# Patient Record
Sex: MALE | Race: WHITE | NOT HISPANIC OR LATINO | Employment: FULL TIME | URBAN - METROPOLITAN AREA
[De-identification: names, ages, dates, MRNs, and addresses within clinical notes are randomized per-mention and may not be internally consistent; named-entity substitution may affect disease eponyms.]

---

## 2017-07-31 ENCOUNTER — ALLSCRIPTS OFFICE VISIT (OUTPATIENT)
Dept: OTHER | Facility: OTHER | Age: 37
End: 2017-07-31

## 2017-12-11 ENCOUNTER — ALLSCRIPTS OFFICE VISIT (OUTPATIENT)
Dept: OTHER | Facility: OTHER | Age: 37
End: 2017-12-11

## 2017-12-12 NOTE — PROGRESS NOTES
Assessment    1  Acute maxillary sinusitis, recurrence not specified (461 0) (J01 00)   2  Never a smoker    Plan  Acute maxillary sinusitis, recurrence not specified    · Amoxicillin 875 MG Oral Tablet; 1 two times a day   · Promethazine-Codeine 6 25-10 MG/5ML Oral Syrup; TAKE 5 ML EVERY 8 HOURSAS NEEDED for cough   · Drink plenty of fluids ; Status:Complete;   Done: [id number]    Discussion/Summary  Possible side effects of new medications were reviewed with the patient/guardian today  The treatment plan was reviewed with the patient/guardian  The patient/guardian understands and agrees with the treatment plan      Chief Complaint  Sinus and chest congestion      History of Present Illness  HPI: he has had 4 days of sinus congestion and pressure  he has a post nasal drip and ear congestion  Review of Systems   Constitutional: no fever  ENT: sore throat  Respiratory: cough  Active Problems  1  Allergic rhinitis (477 9) (J30 9)   2  BMI 24 0-24 9, adult (V85 1) (Z68 24)   3  Murmur (785 2) (R01 1)   4  Other hyperlipidemia (272 4) (E78 4)    Past Medical History  1  History of Abdominal pain, RLQ (right lower quadrant) (789 03) (R10 31)   2  History of Abdominal pain, RUQ (789 01) (R10 11)   3  Acute bronchitis (466 0) (J20 9)   4  History of Acute bronchitis, unspecified organism (466 0) (J20 9)   5  History of Acute upper respiratory infection (465 9) (J06 9)   6  History of Bicipital tendinitis of right shoulder (726 12) (M75 21)   7  History of BMI 26 0-26 9,adult (V85 22) (Z68 26)   8  History of Cough (786 2) (R05)   9  History of Epicondylitis (726 32)   10  History of Exposure to influenza (V01 79) (Z20 828)   11  History of acute sinusitis (V12 69) (Z87 09)   12  History of allergic rhinitis (V12 69) (Z87 09)   13  History of chronic sinusitis (V12 69) (Z87 09)   14  History of esophageal reflux (V12 79) (Z87 19)   15  History of gastroesophageal reflux (GERD) (V12 79) (Z87 19)   16   History of influenza (V12 09) (Z87 09)   17  History of nocturia (V15 89) (Z87 898)   18  History of tinea corporis (V12 09) (Z86 19)   19  History of tinea corporis (V12 09) (Z86 19)   20  History of Lateral epicondylitis, unspecified laterality (726 32) (M77 10)   21  History of Murmur (785 2) (R01 1)   22  History of Pain in joint of right shoulder (719 41) (M25 511)   23  History of Right shoulder pain (719 41) (M25 511)   24  History of Right shoulder pain (719 41) (M25 511)   25  History of Stiffness of shoulder joint, unspecified laterality (719 51) (M25 619)   26  History of Stiffness of shoulder joint, unspecified laterality (719 51) (M25 619)   27  History of Tinea versicolor (111 0) (B36 0)   28  History of URTI (acute upper respiratory infection) (465 9) (J06 9)    Family History  Mother    1  Family history of hyperlipidemia (V18 19) (Z83 49)  Father    2  Family history of malignant neoplasm of prostate (V16 42) (Z80 45)    Social History   · Denied: History of Alcohol Use (History)   · Never a smoker   · History of Never A Smoker  The social history was reviewed and updated today  Surgical History  1  History of Appendectomy   2  History of Open Treatment Of Fracture Of Distal Radius    Current Meds   1  No Reported Medications Recorded    Allergies  1  No Known Drug Allergies    Vitals   Recorded: 05Ymu1002 09:03AM   Temperature 98 F   Heart Rate 72   Respiration 18   Systolic 492   Diastolic 62   Height 5 ft 10 in   Weight 182 lb    BMI Calculated 26 11   BSA Calculated 2 01       Physical Exam   Constitutional  General appearance: No acute distress, well appearing and well nourished  Head and Face  Palpation of the face and sinuses: Abnormal   Examination of the Sinuses: right maxillary tenderness-- and-- left maxillary tenderness  Ears, Nose, Mouth, and Throat  External inspection of ears and nose: Normal    Otoscopic examination: Tympanic membranes translucent with normal light reflex   Canals patent without erythema  Oropharynx: Normal with no erythema, edema, exudate or lesions  Pulmonary  Auscultation of lungs: Clear to auscultation  Cardiovascular  Auscultation of heart: Normal rate and rhythm, normal S1 and S2, no murmurs         Signatures   Electronically signed by : Trinidad Lares DO; Dec 11 2017  9:25AM EST                       (Author)

## 2018-01-14 VITALS
SYSTOLIC BLOOD PRESSURE: 118 MMHG | BODY MASS INDEX: 24.62 KG/M2 | RESPIRATION RATE: 16 BRPM | TEMPERATURE: 96.3 F | WEIGHT: 172 LBS | HEART RATE: 52 BPM | HEIGHT: 70 IN | DIASTOLIC BLOOD PRESSURE: 72 MMHG

## 2018-01-23 VITALS
BODY MASS INDEX: 26.05 KG/M2 | HEIGHT: 70 IN | DIASTOLIC BLOOD PRESSURE: 62 MMHG | RESPIRATION RATE: 18 BRPM | HEART RATE: 72 BPM | SYSTOLIC BLOOD PRESSURE: 112 MMHG | WEIGHT: 182 LBS | TEMPERATURE: 98 F

## 2021-07-22 ENCOUNTER — OFFICE VISIT (OUTPATIENT)
Dept: FAMILY MEDICINE CLINIC | Facility: CLINIC | Age: 41
End: 2021-07-22
Payer: COMMERCIAL

## 2021-07-22 VITALS
WEIGHT: 182 LBS | OXYGEN SATURATION: 98 % | RESPIRATION RATE: 16 BRPM | DIASTOLIC BLOOD PRESSURE: 70 MMHG | HEIGHT: 70 IN | BODY MASS INDEX: 26.05 KG/M2 | SYSTOLIC BLOOD PRESSURE: 120 MMHG | HEART RATE: 72 BPM

## 2021-07-22 DIAGNOSIS — Z11.4 SCREENING FOR HIV (HUMAN IMMUNODEFICIENCY VIRUS): ICD-10-CM

## 2021-07-22 DIAGNOSIS — Z13.6 SCREENING FOR CARDIOVASCULAR CONDITION: ICD-10-CM

## 2021-07-22 DIAGNOSIS — Z11.59 NEED FOR HEPATITIS C SCREENING TEST: ICD-10-CM

## 2021-07-22 DIAGNOSIS — B36.0 TINEA VERSICOLOR: Primary | ICD-10-CM

## 2021-07-22 PROCEDURE — 1036F TOBACCO NON-USER: CPT | Performed by: FAMILY MEDICINE

## 2021-07-22 PROCEDURE — 3725F SCREEN DEPRESSION PERFORMED: CPT | Performed by: FAMILY MEDICINE

## 2021-07-22 PROCEDURE — 3008F BODY MASS INDEX DOCD: CPT | Performed by: FAMILY MEDICINE

## 2021-07-22 PROCEDURE — 99203 OFFICE O/P NEW LOW 30 MIN: CPT | Performed by: FAMILY MEDICINE

## 2021-07-22 RX ORDER — KETOCONAZOLE 200 MG/1
200 TABLET ORAL DAILY
Qty: 7 TABLET | Refills: 0 | Status: SHIPPED | OUTPATIENT
Start: 2021-07-22 | End: 2021-07-29

## 2021-07-22 NOTE — ASSESSMENT & PLAN NOTE
He has discolored spots all over his torso extremities,   advised to see the dermatologist as he gets extensive rash every year   and he says he cannot use the cream and shampoo has his heart to apply and in the past he has been given oral medication which helps   prescription for ketoconazole is given and advised to have labs and come back for follow-up in a week and he needs to see a dermatologist too

## 2021-07-22 NOTE — PROGRESS NOTES
Assessment/Plan:    Problem List Items Addressed This Visit        Musculoskeletal and Integument    Tinea versicolor - Primary      He has discolored spots all over his torso extremities,   advised to see the dermatologist as he gets extensive rash every year   and he says he cannot use the cream and shampoo has his heart to apply and in the past he has been given oral medication which helps   prescription for ketoconazole is given and advised to have labs and come back for follow-up in a week and he needs to see a dermatologist too         Relevant Medications    ketoconazole (NIZORAL) 200 mg tablet    Other Relevant Orders    Ambulatory referral to Dermatology    CBC and differential       Other    Screening for cardiovascular condition    Relevant Orders    CBC and differential    Comprehensive metabolic panel    Lipid panel    TSH, 3rd generation    Need for hepatitis C screening test    Relevant Orders    Hepatitis C antibody    BMI 26 0-26 9,adult      labs and f/u 1 wk     Chief Complaint   Patient presents with    Establish Care   BMI Counseling: Body mass index is 26 11 kg/m²  The BMI is above normal  Nutrition recommendations include decreasing portion sizes, encouraging healthy choices of fruits and vegetables, moderation in carbohydrate intake and reducing intake of cholesterol  Exercise recommendations include moderate physical activity 150 minutes/week and exercising 3-5 times per week  Subjective:   Patient ID: Clemencia Holcomb is a 41 y o  male  He is a new patient, he says he has been getting a skin rash which has been diagnosed tenia last few years, it happens every year during summer ,, he says he used to be in [place] and he do lot of water sports,  He is very active and does regular exercise no smoking alcohol    He says that there is no itchy miss but he gets discolored spots on his back abdomen arms and legs      Review of Systems   Constitutional: Negative for activity change, appetite change, chills, fatigue, fever and unexpected weight change  HENT: Negative for congestion, ear discharge, ear pain, nosebleeds, postnasal drip, rhinorrhea, sinus pressure, sneezing, sore throat, trouble swallowing and voice change  Eyes: Negative for photophobia, pain, discharge, redness and itching  Respiratory: Negative for cough, chest tightness, shortness of breath and wheezing  Cardiovascular: Negative for chest pain, palpitations and leg swelling  Gastrointestinal: Negative for abdominal pain, constipation, diarrhea, nausea and vomiting  Endocrine: Negative for polyuria  Genitourinary: Negative for dysuria, frequency and urgency  Musculoskeletal: Negative for arthralgias, back pain, myalgias and neck pain  Skin: Negative for color change, pallor and rash  Allergic/Immunologic: Negative for environmental allergies and food allergies  Neurological: Negative for dizziness, weakness, light-headedness and headaches  Hematological: Negative for adenopathy  Does not bruise/bleed easily  Psychiatric/Behavioral: Negative for behavioral problems  The patient is not nervous/anxious  Objective:  Physical Exam  Vitals and nursing note reviewed  Constitutional:       Appearance: He is well-developed  HENT:      Head: Normocephalic and atraumatic  Eyes:      General: No scleral icterus  Right eye: No discharge  Left eye: No discharge  Conjunctiva/sclera: Conjunctivae normal       Pupils: Pupils are equal, round, and reactive to light  Neck:      Thyroid: No thyromegaly  Trachea: No tracheal deviation  Cardiovascular:      Rate and Rhythm: Normal rate and regular rhythm  Heart sounds: Normal heart sounds  No murmur heard  Pulmonary:      Effort: Pulmonary effort is normal  No respiratory distress  Breath sounds: Normal breath sounds  No wheezing or rales  Abdominal:      General: Bowel sounds are normal  There is no distension  Palpations: Abdomen is soft  There is no mass  Tenderness: There is no abdominal tenderness  There is no rebound  Musculoskeletal:         General: Normal range of motion  Cervical back: Normal range of motion and neck supple  Right lower leg: No edema  Left lower leg: No edema  Lymphadenopathy:      Cervical: No cervical adenopathy  Skin:     General: Skin is warm  Coloration: Skin is not pale  Findings: No erythema or rash  Neurological:      Mental Status: He is alert and oriented to person, place, and time  Cranial Nerves: No cranial nerve deficit  Deep Tendon Reflexes: Reflexes are normal and symmetric  Psychiatric:         Behavior: Behavior normal          Thought Content:  Thought content normal          Judgment: Judgment normal             Past Surgical History:   Procedure Laterality Date    APPENDECTOMY  02/26/2014    emergency surgery    FRACTURE SURGERY Left 07/20/2005    ORIF of open Monteggia fx dislocation left elbow    OTHER SURGICAL HISTORY      removal of hardware left elbow    SHOULDER ARTHROSCOPY Right 9/15/2016    Procedure: DIAGNOSTIC ARTHROSCOPY;  Surgeon: Debi Brantley MD;  Location: [facility];  Service:    [facility] TOOTH EXTRACTION      x 4       Family History   Problem Relation Age of Onset    No Known Problems Mother     Hypertension Father          Current Outpatient Medications:     ketoconazole (NIZORAL) 200 mg tablet, Take 1 tablet (200 mg total) by mouth daily for 7 days, Disp: 7 tablet, Rfl: 0    No Known Allergies    Vitals:    07/22/21 1425   BP: 120/70   Pulse: 72   Resp: 16   SpO2: 98%   Weight: 82 6 kg (182 lb)   Height: 5' 10" (1 778 m)

## 2021-08-10 DIAGNOSIS — B34.9 VIRAL INFECTION, UNSPECIFIED: Primary | ICD-10-CM

## 2021-08-10 PROCEDURE — U0003 INFECTIOUS AGENT DETECTION BY NUCLEIC ACID (DNA OR RNA); SEVERE ACUTE RESPIRATORY SYNDROME CORONAVIRUS 2 (SARS-COV-2) (CORONAVIRUS DISEASE [COVID-19]), AMPLIFIED PROBE TECHNIQUE, MAKING USE OF HIGH THROUGHPUT TECHNOLOGIES AS DESCRIBED BY CMS-2020-01-R: HCPCS | Performed by: FAMILY MEDICINE

## 2021-08-10 PROCEDURE — U0005 INFEC AGEN DETEC AMPLI PROBE: HCPCS | Performed by: FAMILY MEDICINE

## 2021-08-12 ENCOUNTER — TELEPHONE (OUTPATIENT)
Dept: FAMILY MEDICINE CLINIC | Facility: CLINIC | Age: 41
End: 2021-08-12

## 2021-08-12 ENCOUNTER — TELEMEDICINE (OUTPATIENT)
Dept: FAMILY MEDICINE CLINIC | Facility: CLINIC | Age: 41
End: 2021-08-12
Payer: COMMERCIAL

## 2021-08-12 VITALS — HEIGHT: 70 IN | WEIGHT: 182 LBS | BODY MASS INDEX: 26.05 KG/M2 | TEMPERATURE: 98.4 F

## 2021-08-12 DIAGNOSIS — U07.1 COVID-19 VIRUS DETECTED: Primary | ICD-10-CM

## 2021-08-12 PROBLEM — Z20.822 EXPOSURE TO COVID-19 VIRUS: Status: ACTIVE | Noted: 2021-08-12

## 2021-08-12 PROCEDURE — 3725F SCREEN DEPRESSION PERFORMED: CPT | Performed by: FAMILY MEDICINE

## 2021-08-12 PROCEDURE — 99213 OFFICE O/P EST LOW 20 MIN: CPT | Performed by: FAMILY MEDICINE

## 2021-08-12 NOTE — PROGRESS NOTES
COVID-19 Outpatient Progress Note    Assessment/Plan:    Problem List Items Addressed This Visit     None      Visit Diagnoses     COVID-19 virus detected    -  Primary         Disposition:     [facility] ANTIBODY INFUSION  I have spent 10 minutes directly with the patient  Greater than 50% of this time was spent in counseling/coordination of care regarding: diagnostic results, prognosis, risks and benefits of treatment options, instructions for management, patient and family education, importance of treatment compliance, risk factor reductions and impressions  Verification of patient location:    Patient is located in the following [facility] in which I hold an active license [place]    Encounter provider Delphine Mcdonnell MD    Provider located at [address] [place] [id number]    Recent Visits  No visits were found meeting these conditions  Showing recent visits within past 7 days and meeting all other requirements  Today's Visits  Date Type Provider Dept   08/12/21 Telemedicine Delphine Mcdonnell MD [facility]   Showing today's visits and meeting all other requirements  Future Appointments  No visits were found meeting these conditions  Showing future appointments within next 150 days and meeting all other requirements     This virtual check-in was done via [employer] and patient was informed that this is a secure, HIPAA-compliant platform  He agrees to proceed  Patient agrees to participate in a virtual check in via telephone or video visit instead of presenting to the office to address urgent/immediate medical needs  Patient is aware this is a billable service  After connecting through [facility], the patient was identified by name and date of birth  Rachaelsammy Wills was informed that this was a telemedicine visit and that the exam was being conducted confidentially over secure lines  My office door was closed  No one else was in the room   Shi Lynch Meena acknowledged consent and understanding of privacy and security of the telemedicine visit  I informed the patient that I have reviewed his record in Epic and presented the opportunity for him to ask any questions regarding the visit today  The patient agreed to participate  Subjective:   Jessa Quinteros is a 41 y o  male who has been screened for COVID-19  Symptom change since last report: improving  Patient's symptoms include fatigue and sore throat  Patient denies fever, chills, malaise, congestion, anosmia, loss of taste, cough, shortness of breath, chest tightness, abdominal pain, nausea, vomiting, diarrhea, myalgias and headaches  Alison Napoles has been staying home and has isolated themselves in his home  He is taking care to not share personal items and is cleaning all surfaces that are touched often, like counters, tabletops, and doorknobs using household cleaning sprays or wipes  He is wearing a mask when he leaves his room  Date of positive COVID-19 PCR: 8/10/2021    Lives alone  Lab Results   Component Value Date    SARSCOV2 Positive (A) 08/10/2021     History reviewed  No pertinent past medical history  Past Surgical History:   Procedure Laterality Date    APPENDECTOMY  02/26/2014    emergency surgery    FRACTURE SURGERY Left 07/20/2005    ORIF of open Monteggia fx dislocation left elbow    OTHER SURGICAL HISTORY      removal of hardware left elbow    SHOULDER ARTHROSCOPY Right 9/15/2016    Procedure: DIAGNOSTIC ARTHROSCOPY;  Surgeon: Alex Hobbs MD;  Location: [address] [place] [address];  Service:    [facility] TOOTH EXTRACTION      x 4     No current outpatient medications on file  No current facility-administered medications for this visit  No Known Allergies    Review of Systems   Constitutional: Positive for fatigue  Negative for chills and fever  HENT: Positive for sore throat  Negative for congestion  Respiratory: Negative    Negative for cough, chest tightness and shortness of breath  Cardiovascular: Negative  Gastrointestinal: Negative  Negative for abdominal pain, diarrhea, nausea and vomiting  Musculoskeletal: Negative  Negative for myalgias  Neurological: Negative  Negative for headaches  Psychiatric/Behavioral: Negative  Objective:    Vitals:    08/12/21 0951   Temp: 98 4 °F (36 9 °C)   Weight: 82 6 kg (182 lb)   Height: 5' 10" (1 778 m)       Physical Exam  Constitutional:       General: He is not in acute distress  Appearance: He is well-developed  Pulmonary:      Effort: Pulmonary effort is normal    Neurological:      Mental Status: He is alert and oriented to person, place, and time  Psychiatric:         Behavior: Behavior normal          Thought Content: Thought content normal          Judgment: Judgment normal          VIRTUAL VISIT DISCLAIMER    Albert Hernandez verbally agrees to participate in [employer]  Pt is aware that [employer] could be limited without vital signs or the ability to perform a full hands-on physical Dejuan Girt understands he or the provider may request at any time to terminate the video visit and request the patient to seek care or treatment in person

## 2021-08-18 ENCOUNTER — TELEMEDICINE (OUTPATIENT)
Dept: FAMILY MEDICINE CLINIC | Facility: CLINIC | Age: 41
End: 2021-08-18
Payer: COMMERCIAL

## 2021-08-18 DIAGNOSIS — U07.1 COVID-19 VIRUS DETECTED: Primary | ICD-10-CM

## 2021-08-18 PROCEDURE — 99212 OFFICE O/P EST SF 10 MIN: CPT | Performed by: FAMILY MEDICINE

## 2021-08-18 NOTE — PROGRESS NOTES
COVID-19 Outpatient Progress Note    Assessment/Plan:    Problem List Items Addressed This Visit        Other    COVID-19 virus detected - Primary         Disposition:     I referred patient to a [facility] for further evaluation  I recommended continued isolation until at least 24 hours have passed since recovery defined as resolution of fever without the use of fever-reducing medications AND improvement in COVID symptoms AND 10 days have passed since onset of symptoms (or 10 days have passed since date of first positive viral diagnostic test for asymptomatic patients)  Patient declines antibody infusion, advised to go to Urgent care or ER for evaluation, but he declines  Patient is going to monitor his symptoms at home  If he notices worsening in his symptoms then he will go to the ER  I have spent 10 minutes directly with the patient  Greater than 50% of this time was spent in counseling/coordination of care regarding: prognosis, risks and benefits of treatment options, instructions for management, patient and family education, importance of treatment compliance, risk factor reductions and impressions  Verification of patient location:    Patient is located in the following state in which I hold an active license [place]    Encounter provider Navdeep Enrique MD    Provider located at [address] [place] [phone]    Recent Visits  Date Type Provider Dept   08/12/21 Telephone Gerber Noel   08/12/21 MD Yao Hassan   Showing recent visits within past 7 days and meeting all other requirements  Today's Visits  Date Type Provider Dept   08/18/21 Telemedicine MD Yao Guevara   Showing today's visits and meeting all other requirements  Future Appointments  No visits were found meeting these conditions    Showing future appointments within next 150 days and meeting all other requirements This virtual check-in was done via [employer] and patient was informed that this is a secure, HIPAA-compliant platform  He agrees to proceed  Patient agrees to participate in a virtual check in via telephone or video visit instead of presenting to the office to address urgent/immediate medical needs  Patient is aware this is a billable service  After connecting through [facility], the patient was identified by name and date of birth  Tuan Pimentel was informed that this was a telemedicine visit and that the exam was being conducted confidentially over secure lines  My office door was closed  No one else was in the room  Tuan Pimentel acknowledged consent and understanding of privacy and security of the telemedicine visit  I informed the patient that I have reviewed his record in Epic and presented the opportunity for him to ask any questions regarding the visit today  The patient agreed to participate  Subjective:   Tuan Pimentel is a 41 y o  male who has been screened for COVID-19  Symptom change since last report: resolving  Patient's symptoms include fever, nasal congestion, sore throat, cough and chest tightness  Patient denies chills, fatigue, malaise, anosmia, loss of taste, shortness of breath, abdominal pain, nausea, vomiting, diarrhea, myalgias and headaches  Date of positive COVID-19 PCR: 8/10/2021    Sharon Centeno has been staying home and has isolated themselves in his home  He is taking care to not share personal items and is cleaning all surfaces that are touched often, like counters, tabletops, and doorknobs using household cleaning sprays or wipes  He is wearing a mask when he leaves his room  Lab Results   Component Value Date    SARSCOV2 Positive (A) 08/10/2021     No past medical history on file    Past Surgical History:   Procedure Laterality Date    APPENDECTOMY  02/26/2014    emergency surgery    FRACTURE SURGERY Left 07/20/2005    ORIF of open Monteggia fx dislocation left elbow  OTHER SURGICAL HISTORY      removal of hardware left elbow    SHOULDER ARTHROSCOPY Right 9/15/2016    Procedure: DIAGNOSTIC ARTHROSCOPY;  Surgeon: Rachel Delgado MD;  Location: [address] [place] [address];  Service:    [facility] TOOTH EXTRACTION      x 4     No current outpatient medications on file  No current facility-administered medications for this visit  No Known Allergies    Review of Systems   Constitutional: Positive for fever  Negative for chills and fatigue  HENT: Positive for congestion and sore throat  Respiratory: Positive for cough and chest tightness  Negative for shortness of breath  Cardiovascular: Negative  Negative for chest pain and palpitations  Gastrointestinal: Negative  Negative for abdominal pain, diarrhea, nausea and vomiting  Endocrine: Negative  Genitourinary: Negative  Musculoskeletal: Negative  Negative for myalgias  Allergic/Immunologic: Negative  Neurological: Negative  Negative for headaches  Psychiatric/Behavioral: Negative  Objective: There were no vitals filed for this visit  Physical Exam  Constitutional:       General: He is not in acute distress  Appearance: He is well-developed  Pulmonary:      Effort: Pulmonary effort is normal    Neurological:      Mental Status: He is alert and oriented to person, place, and time  Psychiatric:         Behavior: Behavior normal          Thought Content: Thought content normal          Judgment: Judgment normal          VIRTUAL VISIT DISCLAIMER    Quique Espinoza verbally agrees to participate in [employer]  Pt is aware that [employer] could be limited without vital signs or the ability to perform a full hands-on physical Robert Nikki understands he or the provider may request at any time to terminate the video visit and request the patient to seek care or treatment in person

## 2021-08-24 ENCOUNTER — TELEMEDICINE (OUTPATIENT)
Dept: FAMILY MEDICINE CLINIC | Facility: CLINIC | Age: 41
End: 2021-08-24
Payer: COMMERCIAL

## 2021-08-24 VITALS — WEIGHT: 182 LBS | HEIGHT: 70 IN | BODY MASS INDEX: 26.05 KG/M2 | TEMPERATURE: 98 F

## 2021-08-24 DIAGNOSIS — U07.1 COVID-19 VIRUS DETECTED: Primary | ICD-10-CM

## 2021-08-24 PROCEDURE — 1036F TOBACCO NON-USER: CPT | Performed by: FAMILY MEDICINE

## 2021-08-24 PROCEDURE — 99213 OFFICE O/P EST LOW 20 MIN: CPT | Performed by: FAMILY MEDICINE

## 2021-08-24 PROCEDURE — 3008F BODY MASS INDEX DOCD: CPT | Performed by: FAMILY MEDICINE

## 2021-08-24 NOTE — PROGRESS NOTES
COVID-19 Outpatient Progress Note    Assessment/Plan:    Problem List Items Addressed This Visit        Other    COVID-19 virus detected - Primary         Disposition:     I recommended continued isolation until at least 24 hours have passed since recovery defined as resolution of fever without the use of fever-reducing medications AND improvement in COVID symptoms AND 10 days have passed since onset of symptoms (or 10 days have passed since date of first positive viral diagnostic test for asymptomatic patients)  Fever free now  Continues with fatigue  Requesting RTW 8/30/21  I have spent 10 minutes directly with the patient  Greater than 50% of this time was spent in counseling/coordination of care regarding: prognosis, risks and benefits of treatment options, instructions for management, patient and family education, importance of treatment compliance, risk factor reductions and impressions  Verification of patient location:    Patient is located in the following state in which I hold an active license [place]    Encounter provider Carlos Langley MD    Provider located at [address] [id number]    Recent Visits  Date Type Provider Dept   08/18/21 Urbano Rosenthal MD Pg Nahun Conway   Showing recent visits within past 7 days and meeting all other requirements  Today's Visits  Date Type Provider Dept   08/24/21 Telemedicine MD Yao Miles   Showing today's visits and meeting all other requirements  Future Appointments  No visits were found meeting these conditions  Showing future appointments within next 150 days and meeting all other requirements     This virtual check-in was done via [employer] and patient was informed that this is a secure, HIPAA-compliant platform  He agrees to proceed      Patient agrees to participate in a virtual check in via telephone or video visit instead of presenting to the office to address urgent/immediate medical needs  Patient is aware this is a billable service  After connecting through [facility], the patient was identified by name and date of birth  Grace España was informed that this was a telemedicine visit and that the exam was being conducted confidentially over secure lines  My office door was closed  No one else was in the room  Grace España acknowledged consent and understanding of privacy and security of the telemedicine visit  I informed the patient that I have reviewed his record in Epic and presented the opportunity for him to ask any questions regarding the visit today  The patient agreed to participate  Subjective:   Grace España is a 41 y o  male who has been screened for COVID-19  Symptom change since last report: resolving  Patient's symptoms include fatigue, anosmia (Improving) and loss of taste  Patient denies fever, chills, malaise, congestion, sore throat, cough, shortness of breath, chest tightness, abdominal pain, nausea, vomiting, diarrhea, myalgias and headaches  Mackenzie Platt has been staying home and has isolated themselves in his home  He is taking care to not share personal items and is cleaning all surfaces that are touched often, like counters, tabletops, and doorknobs using household cleaning sprays or wipes  He is wearing a mask when he leaves his room  Lab Results   Component Value Date    SARSCOV2 Positive (A) 08/10/2021     No past medical history on file  Past Surgical History:   Procedure Laterality Date    APPENDECTOMY  02/26/2014    emergency surgery    FRACTURE SURGERY Left 07/20/2005    ORIF of open Monteggia fx dislocation left elbow    OTHER SURGICAL HISTORY      removal of hardware left elbow    SHOULDER ARTHROSCOPY Right 9/15/2016    Procedure: DIAGNOSTIC ARTHROSCOPY;  Surgeon: Mikael Fabry, MD;  Location: [address] [place] [address];  Service:    Olman Nicole TOOTH EXTRACTION      x 4     No current outpatient medications on file       No current facility-administered medications for this visit  No Known Allergies    Review of Systems   Constitutional: Positive for fatigue  Negative for chills and fever  HENT: Negative for congestion and sore throat  Respiratory: Negative  Negative for cough, chest tightness and shortness of breath  Cardiovascular: Negative  Negative for chest pain and palpitations  Gastrointestinal: Negative  Negative for abdominal pain, diarrhea, nausea and vomiting  Endocrine: Negative  Genitourinary: Negative  Musculoskeletal: Negative  Negative for myalgias  Allergic/Immunologic: Negative  Neurological: Negative  Negative for headaches  Psychiatric/Behavioral: Negative  Objective:    Vitals:    08/24/21 0819   Temp: 98 °F (36 7 °C)   Weight: 82 6 kg (182 lb)   Height: 5' 10" (1 778 m)       Physical Exam  Constitutional:       General: He is not in acute distress  Appearance: He is well-developed  Pulmonary:      Effort: Pulmonary effort is normal    Neurological:      Mental Status: He is alert and oriented to person, place, and time  Psychiatric:         Behavior: Behavior normal          Thought Content: Thought content normal          Judgment: Judgment normal          VIRTUAL VISIT DISCLAIMER    Liza Ramachandran verbally agrees to participate in [employer]  Pt is aware that [employer] could be limited without vital signs or the ability to perform a full hands-on physical Kim Sauer understands he or the provider may request at any time to terminate the video visit and request the patient to seek care or treatment in person

## 2022-07-14 ENCOUNTER — APPOINTMENT (OUTPATIENT)
Dept: LAB | Facility: CLINIC | Age: 42
End: 2022-07-14
Payer: COMMERCIAL

## 2022-07-14 DIAGNOSIS — Z11.59 NEED FOR HEPATITIS C SCREENING TEST: ICD-10-CM

## 2022-07-14 DIAGNOSIS — B36.0 TINEA VERSICOLOR: ICD-10-CM

## 2022-07-14 DIAGNOSIS — Z11.4 SCREENING FOR HIV (HUMAN IMMUNODEFICIENCY VIRUS): ICD-10-CM

## 2022-07-14 DIAGNOSIS — Z13.6 SCREENING FOR CARDIOVASCULAR CONDITION: ICD-10-CM

## 2022-07-14 LAB
ALBUMIN SERPL BCP-MCNC: 4 G/DL (ref 3.5–5)
ALP SERPL-CCNC: 66 U/L (ref 46–116)
ALT SERPL W P-5'-P-CCNC: 26 U/L (ref 12–78)
ANION GAP SERPL CALCULATED.3IONS-SCNC: 4 MMOL/L (ref 4–13)
AST SERPL W P-5'-P-CCNC: 19 U/L (ref 5–45)
BASOPHILS # BLD AUTO: 0.05 THOUSANDS/ΜL (ref 0–0.1)
BASOPHILS NFR BLD AUTO: 1 % (ref 0–1)
BILIRUB SERPL-MCNC: 0.77 MG/DL (ref 0.2–1)
BUN SERPL-MCNC: 13 MG/DL (ref 5–25)
CALCIUM SERPL-MCNC: 9.1 MG/DL (ref 8.3–10.1)
CHLORIDE SERPL-SCNC: 107 MMOL/L (ref 100–108)
CHOLEST SERPL-MCNC: 242 MG/DL
CO2 SERPL-SCNC: 26 MMOL/L (ref 21–32)
CREAT SERPL-MCNC: 1.17 MG/DL (ref 0.6–1.3)
EOSINOPHIL # BLD AUTO: 0.17 THOUSAND/ΜL (ref 0–0.61)
EOSINOPHIL NFR BLD AUTO: 4 % (ref 0–6)
ERYTHROCYTE [DISTWIDTH] IN BLOOD BY AUTOMATED COUNT: 12.4 % (ref 11.6–15.1)
GFR SERPL CREATININE-BSD FRML MDRD: 75 ML/MIN/1.73SQ M
GLUCOSE P FAST SERPL-MCNC: 88 MG/DL (ref 65–99)
HCT VFR BLD AUTO: 41.6 % (ref 36.5–49.3)
HDLC SERPL-MCNC: 81 MG/DL
HGB BLD-MCNC: 14 G/DL (ref 12–17)
IMM GRANULOCYTES # BLD AUTO: 0 THOUSAND/UL (ref 0–0.2)
IMM GRANULOCYTES NFR BLD AUTO: 0 % (ref 0–2)
LDLC SERPL CALC-MCNC: 149 MG/DL (ref 0–100)
LYMPHOCYTES # BLD AUTO: 2.3 THOUSANDS/ΜL (ref 0.6–4.47)
LYMPHOCYTES NFR BLD AUTO: 52 % (ref 14–44)
MCH RBC QN AUTO: 29 PG (ref 26.8–34.3)
MCHC RBC AUTO-ENTMCNC: 33.7 G/DL (ref 31.4–37.4)
MCV RBC AUTO: 86 FL (ref 82–98)
MONOCYTES # BLD AUTO: 0.36 THOUSAND/ΜL (ref 0.17–1.22)
MONOCYTES NFR BLD AUTO: 8 % (ref 4–12)
NEUTROPHILS # BLD AUTO: 1.55 THOUSANDS/ΜL (ref 1.85–7.62)
NEUTS SEG NFR BLD AUTO: 35 % (ref 43–75)
NONHDLC SERPL-MCNC: 161 MG/DL
NRBC BLD AUTO-RTO: 0 /100 WBCS
PLATELET # BLD AUTO: 334 THOUSANDS/UL (ref 149–390)
PMV BLD AUTO: 9.9 FL (ref 8.9–12.7)
POTASSIUM SERPL-SCNC: 4.2 MMOL/L (ref 3.5–5.3)
PROT SERPL-MCNC: 7.1 G/DL (ref 6.4–8.2)
RBC # BLD AUTO: 4.82 MILLION/UL (ref 3.88–5.62)
SODIUM SERPL-SCNC: 137 MMOL/L (ref 136–145)
TRIGL SERPL-MCNC: 58 MG/DL
TSH SERPL DL<=0.05 MIU/L-ACNC: 1.13 UIU/ML (ref 0.45–4.5)
WBC # BLD AUTO: 4.43 THOUSAND/UL (ref 4.31–10.16)

## 2022-07-14 PROCEDURE — 85025 COMPLETE CBC W/AUTO DIFF WBC: CPT

## 2022-07-14 PROCEDURE — 80053 COMPREHEN METABOLIC PANEL: CPT

## 2022-07-14 PROCEDURE — 36415 COLL VENOUS BLD VENIPUNCTURE: CPT

## 2022-07-14 PROCEDURE — 86803 HEPATITIS C AB TEST: CPT

## 2022-07-14 PROCEDURE — 87389 HIV-1 AG W/HIV-1&-2 AB AG IA: CPT

## 2022-07-14 PROCEDURE — 80061 LIPID PANEL: CPT

## 2022-07-14 PROCEDURE — 84443 ASSAY THYROID STIM HORMONE: CPT

## 2022-07-15 LAB
HCV AB SER QL: NORMAL
HIV 1+2 AB+HIV1 P24 AG SERPL QL IA: NORMAL

## 2022-07-18 ENCOUNTER — OFFICE VISIT (OUTPATIENT)
Dept: FAMILY MEDICINE CLINIC | Facility: CLINIC | Age: 42
End: 2022-07-18
Payer: COMMERCIAL

## 2022-07-18 VITALS
SYSTOLIC BLOOD PRESSURE: 120 MMHG | WEIGHT: 184 LBS | HEIGHT: 70 IN | HEART RATE: 76 BPM | DIASTOLIC BLOOD PRESSURE: 72 MMHG | OXYGEN SATURATION: 99 % | BODY MASS INDEX: 26.34 KG/M2

## 2022-07-18 DIAGNOSIS — B36.0 TINEA VERSICOLOR: ICD-10-CM

## 2022-07-18 DIAGNOSIS — E78.49 OTHER HYPERLIPIDEMIA: Primary | ICD-10-CM

## 2022-07-18 PROCEDURE — 99214 OFFICE O/P EST MOD 30 MIN: CPT | Performed by: FAMILY MEDICINE

## 2022-07-18 RX ORDER — KETOCONAZOLE 200 MG/1
200 TABLET ORAL DAILY
Qty: 7 TABLET | Refills: 0 | Status: SHIPPED | OUTPATIENT
Start: 2022-07-18 | End: 2022-07-25

## 2022-07-18 NOTE — PROGRESS NOTES
Assessment/Plan:    Problem List Items Addressed This Visit        Musculoskeletal and Integument    Tinea versicolor     He will see the dermatologist, last year he was treated with ketoconazole tablets and he cleared up, refill is given, he says he cannot apply the topical on his back and he wants to take oral medicine         Relevant Medications    ketoconazole (NIZORAL) 200 mg tablet    Other Relevant Orders    Ambulatory Referral to Dermatology       Other    Other hyperlipidemia - Primary     ASCVD risk is 1 1%, he will work on his diet and exercise and follow-up lipid panel in 6 month         Relevant Orders    Lipid panel          Return in about 6 months (around 1/18/2023)  Chief Complaint   Patient presents with    Follow-up     Review labs       Subjective:   Patient ID: Autumn Ellsworth is a 35 y o  male  Says every summer he gets rash on his back and last year he was given the ketoconazole tablets for 7 days which made it clear, and the knee was fine now he has again all the rash which is not itchy but the red spots all over the back, and he wants to see dermatologist to  Nonsmoker and he says he did not do exercise in last 1 year and that is why his cholesterol is up  Does not drink alcohol  HPI    Review of Systems   Constitutional: Negative  HENT: Negative  Eyes: Negative  Respiratory: Negative  Cardiovascular: Negative  Skin: Positive for rash  Rash on his back        Objective:  Physical Exam  Vitals and nursing note reviewed  Constitutional:       Appearance: He is well-developed  HENT:      Head: Normocephalic and atraumatic  Right Ear: External ear normal       Left Ear: External ear normal       Mouth/Throat:      Pharynx: No oropharyngeal exudate  Eyes:      General: No scleral icterus  Right eye: No discharge  Left eye: No discharge        Conjunctiva/sclera: Conjunctivae normal       Pupils: Pupils are equal, round, and reactive to light    Neck:      Thyroid: No thyromegaly  Trachea: No tracheal deviation  Cardiovascular:      Rate and Rhythm: Normal rate and regular rhythm  Heart sounds: Normal heart sounds  No murmur heard  Pulmonary:      Effort: Pulmonary effort is normal  No respiratory distress  Breath sounds: Normal breath sounds  No wheezing or rales  Abdominal:      General: Bowel sounds are normal  There is no distension  Palpations: Abdomen is soft  There is no mass  Tenderness: There is no abdominal tenderness  There is no rebound  Musculoskeletal:         General: Normal range of motion  Cervical back: Normal range of motion and neck supple  Right lower leg: No edema  Left lower leg: No edema  Lymphadenopathy:      Cervical: No cervical adenopathy  Skin:     General: Skin is warm  Coloration: Skin is not pale  Findings: Rash present  No erythema  Comments: Erythematous macular rash all over back   Neurological:      Mental Status: He is alert and oriented to person, place, and time  Cranial Nerves: No cranial nerve deficit  Deep Tendon Reflexes: Reflexes are normal and symmetric  Psychiatric:         Behavior: Behavior normal          Thought Content:  Thought content normal          Judgment: Judgment normal             Past Surgical History:   Procedure Laterality Date    APPENDECTOMY  02/26/2014    emergency surgery    FRACTURE SURGERY Left 07/20/2005    ORIF of open Monteggia fx dislocation left elbow    OTHER SURGICAL HISTORY      removal of hardware left elbow    SHOULDER ARTHROSCOPY Right 9/15/2016    Procedure: DIAGNOSTIC ARTHROSCOPY;  Surgeon: Rebecca Camargo MD;  Location: [facility];  Service:    Bonyblayne Pately TOOTH EXTRACTION      x 4       Family History   Problem Relation Age of Onset    No Known Problems Mother     Hypertension Father          Current Outpatient Medications:     ketoconazole (NIZORAL) 200 mg tablet, Take 1 tablet (200 mg total) by mouth daily for 7 days, Disp: 7 tablet, Rfl: 0    No Known Allergies    Vitals:    07/18/22 1514   BP: 120/72   Pulse: 76   SpO2: 99%   Weight: 83 5 kg (184 lb)   Height: 5' 10" (1 778 m)

## 2022-07-18 NOTE — ASSESSMENT & PLAN NOTE
He will see the dermatologist, last year he was treated with ketoconazole tablets and he cleared up, refill is given, he says he cannot apply the topical on his back and he wants to take oral medicine

## 2022-10-12 PROBLEM — Z11.59 NEED FOR HEPATITIS C SCREENING TEST: Status: RESOLVED | Noted: 2021-07-22 | Resolved: 2022-10-12

## 2022-10-12 PROBLEM — Z13.6 SCREENING FOR CARDIOVASCULAR CONDITION: Status: RESOLVED | Noted: 2021-07-22 | Resolved: 2022-10-12

## 2023-01-19 ENCOUNTER — OFFICE VISIT (OUTPATIENT)
Dept: FAMILY MEDICINE CLINIC | Facility: CLINIC | Age: 43
End: 2023-01-19

## 2023-01-19 VITALS
OXYGEN SATURATION: 98 % | WEIGHT: 194 LBS | HEART RATE: 76 BPM | DIASTOLIC BLOOD PRESSURE: 72 MMHG | RESPIRATION RATE: 18 BRPM | SYSTOLIC BLOOD PRESSURE: 126 MMHG | HEIGHT: 70 IN | BODY MASS INDEX: 27.77 KG/M2

## 2023-01-19 DIAGNOSIS — Z00.00 PREVENTATIVE HEALTH CARE: Primary | ICD-10-CM

## 2023-01-19 DIAGNOSIS — Z12.5 SCREENING FOR PROSTATE CANCER: ICD-10-CM

## 2023-01-19 DIAGNOSIS — Z00.00 ANNUAL PHYSICAL EXAM: ICD-10-CM

## 2023-01-19 DIAGNOSIS — B36.0 TINEA VERSICOLOR: ICD-10-CM

## 2023-01-19 DIAGNOSIS — E78.49 OTHER HYPERLIPIDEMIA: ICD-10-CM

## 2023-01-19 PROBLEM — Z20.822 EXPOSURE TO COVID-19 VIRUS: Status: RESOLVED | Noted: 2021-08-12 | Resolved: 2023-01-19

## 2023-01-19 PROBLEM — U07.1 COVID-19 VIRUS DETECTED: Status: RESOLVED | Noted: 2021-08-18 | Resolved: 2023-01-19

## 2023-01-19 NOTE — ASSESSMENT & PLAN NOTE
Patient reports recurring infection which occurs during summer  Usually responds well to oral antifungal   Currently asymptomatic  Requesting a referral to establish with dermatology to discuss treatment options

## 2023-01-19 NOTE — PATIENT INSTRUCTIONS

## 2023-01-19 NOTE — ASSESSMENT & PLAN NOTE
Labs discussed with patient  Noted to have borderline high LDL, HDL 81  Recommended low-fat diet, regular exercise  Repeat lipid panel  Patient will be contacted with results

## 2023-01-19 NOTE — ASSESSMENT & PLAN NOTE
Patient due for metabolic labs  Discussed prostate and colorectal cancer screening  PSA advised  Will be contacted with results  Patient denies any urinary symptoms

## 2023-01-19 NOTE — PROGRESS NOTES
ADULT ANNUAL [address]  [facility]    NAME: Addy Marcus  AGE: 38 y o  SEX: male  : 1980     DATE: 2023     Assessment and Plan:     Problem List Items Addressed This Visit        Musculoskeletal and Integument    Tinea versicolor     Patient reports recurring infection which occurs during summer  Usually responds well to oral antifungal   Currently asymptomatic  Requesting a referral to establish with dermatology to discuss treatment options  Relevant Orders    Ambulatory Referral to Dermatology       Other    Preventative health care - Primary     Patient due for metabolic labs  Discussed prostate and colorectal cancer screening  PSA advised  Will be contacted with results  Patient denies any urinary symptoms  Relevant Orders    Comprehensive metabolic panel    Lipid panel    Other hyperlipidemia     Labs discussed with patient  Noted to have borderline high LDL, HDL 81  Recommended low-fat diet, regular exercise  Repeat lipid panel  Patient will be contacted with results  Relevant Orders    Comprehensive metabolic panel    Lipid panel    Screening for prostate cancer    Relevant Orders    PSA, Total Screen       Immunizations and preventive care screenings were discussed with patient today  Appropriate education was printed on patient's after visit summary  Discussed risks and benefits of prostate cancer screening  We discussed the controversial history of PSA screening for prostate cancer in the [place] as well as the risk of over detection and over treatment of prostate cancer by way of PSA screening  The patient understands that PSA blood testing is an imperfect way to screen for prostate cancer and that elevated PSA levels in the blood may also be caused by infection, inflammation, prostatic trauma or manipulation, urological procedures, or by benign prostatic enlargement      The role of the digital rectal examination in prostate cancer screening was also discussed and I discussed with him that there is large interobserver variability in the findings of digital rectal examination  Counseling:  Dental Health: discussed importance of regular tooth brushing, flossing, and dental visits  · Exercise: the importance of regular exercise/physical activity was discussed  Recommend exercise 3-5 times per week for at least 30 minutes  BMI Counseling: Body mass index is 27 84 kg/m²  The BMI is above normal  Nutrition recommendations include encouraging healthy choices of fruits and vegetables  Rationale for BMI follow-up plan is due to patient being overweight or obese  Depression Screening and Follow-up Plan: Patient was screened for depression during today's encounter  They screened negative with a PHQ-2 score of 0  No follow-ups on file  Chief Complaint:     Chief Complaint   Patient presents with   • Physical Exam      History of Present Illness:     Adult Annual Physical   Patient here for a comprehensive physical exam  The patient reports no problems  Patient has history of recurring fungal infection of the upper back  Usually occurs during summer  Patient responds well to oral antifungal medication  Currently asymptomatic  Diet and Physical Activity  · Diet/Nutrition: well balanced diet  · Exercise: walking  Depression Screening  PHQ-2/9 Depression Screening    Little interest or pleasure in doing things: 0 - not at all  Feeling down, depressed, or hopeless: 0 - not at all  PHQ-2 Score: 0  PHQ-2 Interpretation: Negative depression screen       General Health  · Sleep: sleeps well  · Hearing: normal - bilateral   · Vision: no vision problems  · Dental: regular dental visits   Health  · Symptoms include: none     Review of Systems:     Review of Systems   Constitutional: Negative  Respiratory: Negative  Cardiovascular: Negative      Gastrointestinal: Negative  Past Medical History:     History reviewed  No pertinent past medical history     Past Surgical History:     Past Surgical History:   Procedure Laterality Date   • APPENDECTOMY  2014    emergency surgery   • FRACTURE SURGERY Left 2005    ORIF of open Monteggia fx dislocation left elbow   • OTHER SURGICAL HISTORY      removal of hardware left elbow   • SHOULDER ARTHROSCOPY Right 9/15/2016    Procedure: DIAGNOSTIC ARTHROSCOPY;  Surgeon: Bernice Brock MD;  Location: [address] [place] [address];  Service:    • WISDOM TOOTH EXTRACTION      x 4      Family History:     Family History   Problem Relation Age of Onset   • No Known Problems Mother    • Hypertension Father    • Prostate cancer Father       Social History:     Social History     Socioeconomic History   • Marital status: Single     Spouse name: None   • Number of children: None   • Years of education: 2 yr college   • Highest education level: None   Occupational History   • None   Tobacco Use   • Smoking status: Never   • Smokeless tobacco: Never   Vaping Use   • Vaping Use: Never used   Substance and Sexual Activity   • Alcohol use: No   • Drug use: No   • Sexual activity: None   Other Topics Concern   • None   Social History Narrative    Most recent tobacco use screenin2019    Do you currently or have you served in the [employer]: No    Were you activated, into active duty, as a member of the [employer] or as a Reservist: No    Education: 2 Year College    Marital status: Single    Exercise level: Heavy    Diet: Specific    General stress level: Medium    Alcohol intake: None    Caffeine intake: Occasional    Chewing tobacco: none    Guns present in home: No    Seat belts used routinely: Yes    Sunscreen used routinely: Yes    Smoke alarm in home: Yes    Advance directive: No    Live alone or with others: alone    Are there stairs in your home: Yes    Pets: No     Social Determinants of Health     Financial Resource Strain: Not on file   Food Insecurity: Not on file   Transportation Needs: Not on file   Physical Activity: Not on file   Stress: Not on file   Social Connections: Not on file   Intimate Partner Violence: Not on file   Housing Stability: Not on file      Current Medications:     No current outpatient medications on file  No current facility-administered medications for this visit  Allergies:     No Known Allergies   Physical Exam:     /72 (BP Location: Left arm, Patient Position: Sitting, Cuff Size: Large)   Pulse 76   Resp 18   Ht 5' 10" (1 778 m)   Wt 88 kg (194 lb)   SpO2 98%   BMI 27 84 kg/m²     Physical Exam  Constitutional:       Appearance: Normal appearance  Cardiovascular:      Heart sounds: Normal heart sounds  Pulmonary:      Breath sounds: Normal breath sounds  Abdominal:      Palpations: Abdomen is soft  Musculoskeletal:      Right lower leg: No edema  Left lower leg: No edema  Lymphadenopathy:      Cervical: No cervical adenopathy     Psychiatric:         Mood and Affect: Mood normal           Sheree Plummer MD  [place] [id number]

## 2023-01-27 ENCOUNTER — APPOINTMENT (OUTPATIENT)
Dept: LAB | Facility: CLINIC | Age: 43
End: 2023-01-27

## 2023-01-27 DIAGNOSIS — E78.49 OTHER HYPERLIPIDEMIA: ICD-10-CM

## 2023-01-27 DIAGNOSIS — Z12.5 SCREENING FOR PROSTATE CANCER: ICD-10-CM

## 2023-01-27 DIAGNOSIS — Z00.00 PREVENTATIVE HEALTH CARE: ICD-10-CM

## 2023-01-27 LAB
ALBUMIN SERPL BCP-MCNC: 4.5 G/DL (ref 3.5–5)
ALP SERPL-CCNC: 65 U/L (ref 34–104)
ALT SERPL W P-5'-P-CCNC: 11 U/L (ref 7–52)
ANION GAP SERPL CALCULATED.3IONS-SCNC: 7 MMOL/L (ref 4–13)
AST SERPL W P-5'-P-CCNC: 14 U/L (ref 13–39)
BILIRUB SERPL-MCNC: 0.63 MG/DL (ref 0.2–1)
BUN SERPL-MCNC: 15 MG/DL (ref 5–25)
CALCIUM SERPL-MCNC: 9.5 MG/DL (ref 8.4–10.2)
CHLORIDE SERPL-SCNC: 105 MMOL/L (ref 96–108)
CHOLEST SERPL-MCNC: 263 MG/DL
CO2 SERPL-SCNC: 27 MMOL/L (ref 21–32)
CREAT SERPL-MCNC: 0.95 MG/DL (ref 0.6–1.3)
GFR SERPL CREATININE-BSD FRML MDRD: 96 ML/MIN/1.73SQ M
GLUCOSE P FAST SERPL-MCNC: 80 MG/DL (ref 65–99)
HDLC SERPL-MCNC: 75 MG/DL
LDLC SERPL CALC-MCNC: 177 MG/DL (ref 0–100)
NONHDLC SERPL-MCNC: 188 MG/DL
POTASSIUM SERPL-SCNC: 3.9 MMOL/L (ref 3.5–5.3)
PROT SERPL-MCNC: 7.2 G/DL (ref 6.4–8.4)
PSA SERPL-MCNC: 1.6 NG/ML (ref 0–4)
SODIUM SERPL-SCNC: 139 MMOL/L (ref 135–147)
TRIGL SERPL-MCNC: 54 MG/DL

## 2023-02-02 ENCOUNTER — OFFICE VISIT (OUTPATIENT)
Dept: FAMILY MEDICINE CLINIC | Facility: CLINIC | Age: 43
End: 2023-02-02

## 2023-02-02 VITALS
DIASTOLIC BLOOD PRESSURE: 70 MMHG | HEART RATE: 77 BPM | RESPIRATION RATE: 16 BRPM | SYSTOLIC BLOOD PRESSURE: 108 MMHG | BODY MASS INDEX: 27.58 KG/M2 | WEIGHT: 192.2 LBS | OXYGEN SATURATION: 98 %

## 2023-02-02 DIAGNOSIS — E78.49 OTHER HYPERLIPIDEMIA: Primary | ICD-10-CM

## 2023-02-02 NOTE — ASSESSMENT & PLAN NOTE
Unfortunately patient's cholesterol control has worsened  Total cholesterol 263,   Patient usually eats very healthy however was unable to do so in the past few months  Different treatment options discussed with patient  He would like to start with low-fat diet and exercise  Patient does not want to start any medication yet  Denies any family history of cardiovascular episodes  Recommended to repeat labs in 6 months/follow-up thereafter

## 2023-02-02 NOTE — PROGRESS NOTES
Subjective:      Patient ID: Stacey Nix is a 38 y o  male  Hyperlipidemia  This is a recurrent problem  The current episode started more than 1 month ago  The problem is uncontrolled  Recent lipid tests were reviewed and are high (Total cholesterol 263, , HDL 75)  Pertinent negatives include no chest pain or myalgias  He is currently on no antihyperlipidemic treatment  Compliance problems include adherence to diet  Risk factors for coronary artery disease include dyslipidemia and male sex  History reviewed  No pertinent past medical history  Family History   Problem Relation Age of Onset   • No Known Problems Mother    • Hypertension Father    • Prostate cancer Father        Past Surgical History:   Procedure Laterality Date   • APPENDECTOMY  02/26/2014    emergency surgery   • FRACTURE SURGERY Left 07/20/2005    ORIF of open Monteggia fx dislocation left elbow   • OTHER SURGICAL HISTORY      removal of hardware left elbow   • SHOULDER ARTHROSCOPY Right 9/15/2016    Procedure: DIAGNOSTIC ARTHROSCOPY;  Surgeon: Phoebe Celis MD;  Location: [facility];  Service:    • WISDOM TOOTH EXTRACTION      x 4        reports that he has never smoked  He has never used smokeless tobacco  He reports that he does not drink alcohol and does not use drugs  No current outpatient medications on file  The following portions of the patient's history were reviewed and updated as appropriate: allergies, current medications, past family history, past medical history, past social history, past surgical history and problem list     Review of Systems   Cardiovascular: Negative for chest pain  Musculoskeletal: Negative for myalgias  Objective:    /70 (BP Location: Left arm, Patient Position: Sitting, Cuff Size: Large)   Pulse 77   Resp 16   Wt 87 2 kg (192 lb 3 2 oz)   SpO2 98%   BMI 27 58 kg/m²      Physical Exam  Constitutional:       Appearance: Normal appearance     Cardiovascular: Heart sounds: Normal heart sounds  Pulmonary:      Breath sounds: Normal breath sounds  Recent Results (from the past 1008 hour(s))   Comprehensive metabolic panel    Collection Time: 01/27/23  3:06 PM   Result Value Ref Range    Sodium 139 135 - 147 mmol/L    Potassium 3 9 3 5 - 5 3 mmol/L    Chloride 105 96 - 108 mmol/L    CO2 27 21 - 32 mmol/L    ANION GAP 7 4 - 13 mmol/L    BUN 15 5 - 25 mg/dL    Creatinine 0 95 0 60 - 1 30 mg/dL    Glucose, Fasting 80 65 - 99 mg/dL    Calcium 9 5 8 4 - 10 2 mg/dL    AST 14 13 - 39 U/L    ALT 11 7 - 52 U/L    Alkaline Phosphatase 65 34 - 104 U/L    Total Protein 7 2 6 4 - 8 4 g/dL    Albumin 4 5 3 5 - 5 0 g/dL    Total Bilirubin 0 63 0 20 - 1 00 mg/dL    eGFR 96 ml/min/1 73sq m   Lipid panel    Collection Time: 01/27/23  3:06 PM   Result Value Ref Range    Cholesterol 263 (H) See Comment mg/dL    Triglycerides 54 See Comment mg/dL    HDL, Direct 75 >=40 mg/dL    LDL Calculated 177 (H) 0 - 100 mg/dL    Non-HDL-Chol (CHOL-HDL) 188 mg/dl   PSA, Total Screen    Collection Time: 01/27/23  3:06 PM   Result Value Ref Range    PSA 1 6 0 0 - 4 0 ng/mL       Assessment/Plan:    No problem-specific Assessment & Plan notes found for this encounter  Problem List Items Addressed This Visit        Other    Other hyperlipidemia - Primary     Unfortunately patient's cholesterol control has worsened  Total cholesterol 263,   Patient usually eats very healthy however was unable to do so in the past few months  Different treatment options discussed with patient  He would like to start with low-fat diet and exercise  Patient does not want to start any medication yet  Denies any family history of cardiovascular episodes  Recommended to repeat labs in 6 months/follow-up thereafter           Relevant Orders    Comprehensive metabolic panel    Lipid panel     I have spent 15 minutes with Patient  today in which greater than 50% of this time was spent in counseling/coordination of care regarding Diagnostic results, Prognosis, Risks and benefits of tx options, Intructions for management, Patient and family education, Importance of tx compliance, Risk factor reductions and Impressions

## 2023-03-20 PROBLEM — Z12.5 SCREENING FOR PROSTATE CANCER: Status: RESOLVED | Noted: 2023-01-19 | Resolved: 2023-03-20

## 2023-06-29 ENCOUNTER — APPOINTMENT (OUTPATIENT)
Dept: LAB | Facility: CLINIC | Age: 43
End: 2023-06-29
Payer: COMMERCIAL

## 2023-06-29 DIAGNOSIS — E78.49 OTHER HYPERLIPIDEMIA: ICD-10-CM

## 2023-06-29 LAB
ALBUMIN SERPL BCP-MCNC: 4.3 G/DL (ref 3.5–5)
ALP SERPL-CCNC: 61 U/L (ref 34–104)
ALT SERPL W P-5'-P-CCNC: 11 U/L (ref 7–52)
ANION GAP SERPL CALCULATED.3IONS-SCNC: 11 MMOL/L
AST SERPL W P-5'-P-CCNC: 15 U/L (ref 13–39)
BILIRUB SERPL-MCNC: 0.77 MG/DL (ref 0.2–1)
BUN SERPL-MCNC: 12 MG/DL (ref 5–25)
CALCIUM SERPL-MCNC: 9.1 MG/DL (ref 8.4–10.2)
CHLORIDE SERPL-SCNC: 106 MMOL/L (ref 96–108)
CHOLEST SERPL-MCNC: 214 MG/DL
CO2 SERPL-SCNC: 23 MMOL/L (ref 21–32)
CREAT SERPL-MCNC: 0.91 MG/DL (ref 0.6–1.3)
GFR SERPL CREATININE-BSD FRML MDRD: 102 ML/MIN/1.73SQ M
GLUCOSE P FAST SERPL-MCNC: 83 MG/DL (ref 65–99)
HDLC SERPL-MCNC: 64 MG/DL
LDLC SERPL CALC-MCNC: 141 MG/DL (ref 0–100)
NONHDLC SERPL-MCNC: 150 MG/DL
POTASSIUM SERPL-SCNC: 3.8 MMOL/L (ref 3.5–5.3)
PROT SERPL-MCNC: 6.9 G/DL (ref 6.4–8.4)
SODIUM SERPL-SCNC: 140 MMOL/L (ref 135–147)
TRIGL SERPL-MCNC: 47 MG/DL

## 2023-06-29 PROCEDURE — 80061 LIPID PANEL: CPT

## 2023-06-29 PROCEDURE — 36415 COLL VENOUS BLD VENIPUNCTURE: CPT

## 2023-06-29 PROCEDURE — 80053 COMPREHEN METABOLIC PANEL: CPT

## 2023-07-13 ENCOUNTER — OFFICE VISIT (OUTPATIENT)
Dept: FAMILY MEDICINE CLINIC | Facility: CLINIC | Age: 43
End: 2023-07-13
Payer: COMMERCIAL

## 2023-07-13 VITALS
HEART RATE: 70 BPM | BODY MASS INDEX: 26.77 KG/M2 | SYSTOLIC BLOOD PRESSURE: 116 MMHG | WEIGHT: 187 LBS | DIASTOLIC BLOOD PRESSURE: 76 MMHG | OXYGEN SATURATION: 98 % | HEIGHT: 70 IN

## 2023-07-13 DIAGNOSIS — E78.49 OTHER HYPERLIPIDEMIA: Primary | ICD-10-CM

## 2023-07-13 PROCEDURE — 99213 OFFICE O/P EST LOW 20 MIN: CPT | Performed by: FAMILY MEDICINE

## 2023-07-13 NOTE — ASSESSMENT & PLAN NOTE
Patient's ldl cholesterol has improved significantly from 177-141 with diet and lifestyle changes. Patient does not have any other cardiac risk factors, no family history of CVD. Patient is going to continue with his diet and exercise efforts.   Recommended to continue monitoring lipid panel at 6-month interval

## 2023-07-13 NOTE — PROGRESS NOTES
Subjective:      Patient ID: Dallas Amaral is a 38 y.o. male. HPI    Patient is following up on his cholesterol panel. Was noted to have elevated . His LDL has improved with diet and exercise to 141. Liver enzymes stable. Patient is asymptomatic. Blood pressure and fasting blood sugar are all within normal limits. History reviewed. No pertinent past medical history. Family History   Problem Relation Age of Onset   • No Known Problems Mother    • Hypertension Father    • Prostate cancer Father        Past Surgical History:   Procedure Laterality Date   • APPENDECTOMY  02/26/2014    emergency surgery   • FRACTURE SURGERY Left 07/20/2005    ORIF of open Monteggia fx dislocation left elbow   • OTHER SURGICAL HISTORY      removal of hardware left elbow   • SHOULDER ARTHROSCOPY Right 9/15/2016    Procedure: DIAGNOSTIC ARTHROSCOPY;  Surgeon: Katarina Barrera MD;  Location: [facility];  Service:    • WISDOM TOOTH EXTRACTION      x 4        reports that he has never smoked. He has never used smokeless tobacco. He reports that he does not drink alcohol and does not use drugs. No current outpatient medications on file. The following portions of the patient's history were reviewed and updated as appropriate: allergies, current medications, past family history, past medical history, past social history, past surgical history and problem list.    Review of Systems   Constitutional: Negative. Respiratory: Negative. Objective:    /76   Pulse 70   Ht 5' 10" (1.778 m)   Wt 84.8 kg (187 lb)   SpO2 98%   BMI 26.83 kg/m²      Physical Exam  Constitutional:       Appearance: Normal appearance. Cardiovascular:      Heart sounds: Normal heart sounds. Pulmonary:      Breath sounds: Normal breath sounds.            Recent Results (from the past 1008 hour(s))   Comprehensive metabolic panel    Collection Time: 06/29/23  3:10 PM   Result Value Ref Range    Sodium 140 135 - 147 mmol/L Potassium 3.8 3.5 - 5.3 mmol/L    Chloride 106 96 - 108 mmol/L    CO2 23 21 - 32 mmol/L    ANION GAP 11 mmol/L    BUN 12 5 - 25 mg/dL    Creatinine 0.91 0.60 - 1.30 mg/dL    Glucose, Fasting 83 65 - 99 mg/dL    Calcium 9.1 8.4 - 10.2 mg/dL    AST 15 13 - 39 U/L    ALT 11 7 - 52 U/L    Alkaline Phosphatase 61 34 - 104 U/L    Total Protein 6.9 6.4 - 8.4 g/dL    Albumin 4.3 3.5 - 5.0 g/dL    Total Bilirubin 0.77 0.20 - 1.00 mg/dL    eGFR 102 ml/min/1.73sq m   Lipid panel    Collection Time: 06/29/23  3:10 PM   Result Value Ref Range    Cholesterol 214 (H) See Comment mg/dL    Triglycerides 47 See Comment mg/dL    HDL, Direct 64 >=40 mg/dL    LDL Calculated 141 (H) 0 - 100 mg/dL    Non-HDL-Chol (CHOL-HDL) 150 mg/dl       Assessment/Plan:    No problem-specific Assessment & Plan notes found for this encounter. Problem List Items Addressed This Visit        Other    Other hyperlipidemia - Primary     Patient's ldl cholesterol has improved significantly from 177-141 with diet and lifestyle changes. Patient does not have any other cardiac risk factors, no family history of CVD. Patient is going to continue with his diet and exercise efforts.   Recommended to continue monitoring lipid panel at 6-month interval         Relevant Orders    Comprehensive metabolic panel    Lipid panel

## 2023-12-04 ENCOUNTER — APPOINTMENT (OUTPATIENT)
Dept: LAB | Facility: CLINIC | Age: 43
End: 2023-12-04
Payer: COMMERCIAL

## 2023-12-04 DIAGNOSIS — E78.49 OTHER HYPERLIPIDEMIA: ICD-10-CM

## 2023-12-04 LAB
ALBUMIN SERPL BCP-MCNC: 4.6 G/DL (ref 3.5–5)
ALP SERPL-CCNC: 58 U/L (ref 34–104)
ALT SERPL W P-5'-P-CCNC: 9 U/L (ref 7–52)
ANION GAP SERPL CALCULATED.3IONS-SCNC: 10 MMOL/L
AST SERPL W P-5'-P-CCNC: 13 U/L (ref 13–39)
BILIRUB SERPL-MCNC: 0.55 MG/DL (ref 0.2–1)
BUN SERPL-MCNC: 14 MG/DL (ref 5–25)
CALCIUM SERPL-MCNC: 9.4 MG/DL (ref 8.4–10.2)
CHLORIDE SERPL-SCNC: 104 MMOL/L (ref 96–108)
CHOLEST SERPL-MCNC: 274 MG/DL
CO2 SERPL-SCNC: 26 MMOL/L (ref 21–32)
CREAT SERPL-MCNC: 0.98 MG/DL (ref 0.6–1.3)
GFR SERPL CREATININE-BSD FRML MDRD: 92 ML/MIN/1.73SQ M
GLUCOSE P FAST SERPL-MCNC: 85 MG/DL (ref 65–99)
HDLC SERPL-MCNC: 75 MG/DL
LDLC SERPL CALC-MCNC: 188 MG/DL (ref 0–100)
NONHDLC SERPL-MCNC: 199 MG/DL
POTASSIUM SERPL-SCNC: 3.7 MMOL/L (ref 3.5–5.3)
PROT SERPL-MCNC: 7.2 G/DL (ref 6.4–8.4)
SODIUM SERPL-SCNC: 140 MMOL/L (ref 135–147)
TRIGL SERPL-MCNC: 53 MG/DL

## 2023-12-04 PROCEDURE — 80053 COMPREHEN METABOLIC PANEL: CPT

## 2023-12-04 PROCEDURE — 80061 LIPID PANEL: CPT

## 2023-12-04 PROCEDURE — 36415 COLL VENOUS BLD VENIPUNCTURE: CPT

## 2024-03-07 ENCOUNTER — OFFICE VISIT (OUTPATIENT)
Dept: FAMILY MEDICINE CLINIC | Facility: CLINIC | Age: 44
End: 2024-03-07
Payer: COMMERCIAL

## 2024-03-07 VITALS
WEIGHT: 200 LBS | OXYGEN SATURATION: 97 % | SYSTOLIC BLOOD PRESSURE: 128 MMHG | HEIGHT: 70 IN | HEART RATE: 80 BPM | DIASTOLIC BLOOD PRESSURE: 80 MMHG | BODY MASS INDEX: 28.63 KG/M2

## 2024-03-07 DIAGNOSIS — Z00.00 ANNUAL PHYSICAL EXAM: ICD-10-CM

## 2024-03-07 DIAGNOSIS — E78.49 OTHER HYPERLIPIDEMIA: Primary | ICD-10-CM

## 2024-03-07 DIAGNOSIS — Z12.11 SCREEN FOR COLON CANCER: ICD-10-CM

## 2024-03-07 DIAGNOSIS — Z12.5 PROSTATE CANCER SCREENING: ICD-10-CM

## 2024-03-07 DIAGNOSIS — Z00.00 PREVENTATIVE HEALTH CARE: ICD-10-CM

## 2024-03-07 PROCEDURE — 99213 OFFICE O/P EST LOW 20 MIN: CPT | Performed by: FAMILY MEDICINE

## 2024-03-07 PROCEDURE — 99396 PREV VISIT EST AGE 40-64: CPT | Performed by: FAMILY MEDICINE

## 2024-03-07 NOTE — PROGRESS NOTES
ADULT ANNUAL PHYSICAL  [facility]    NAME: Eddie Robledo  AGE: 43 y.o. SEX: male  : 1980     DATE: 3/7/2024     Assessment and Plan:     Problem List Items Addressed This Visit          Other    Screen for colon cancer    Relevant Orders    Ambulatory Referral to Gastroenterology    Other hyperlipidemia - Primary     Patient's ldl cholesterol 188.     Patient does not have any other cardiac risk factors, no family history of CVD.  Patient is going to continue with his diet and exercise efforts.  Avoid saturated fat, red meat.  Patient declines statins today.  Recommended to continue monitoring lipid panel at 4-month interval         Relevant Orders    Comprehensive metabolic panel    Lipid panel     Other Visit Diagnoses       Annual physical exam                Immunizations and preventive care screenings were discussed with patient today. Appropriate education was printed on patient's after visit summary.    Discussed risks and benefits of prostate cancer screening. We discussed the controversial history of PSA screening for prostate cancer in the [place] as well as the risk of over detection and over treatment of prostate cancer by way of PSA screening.  The patient understands that PSA blood testing is an imperfect way to screen for prostate cancer and that elevated PSA levels in the blood may also be caused by infection, inflammation, prostatic trauma or manipulation, urological procedures, or by benign prostatic enlargement.    The role of the digital rectal examination in prostate cancer screening was also discussed and I discussed with him that there is large interobserver variability in the findings of digital rectal examination.    Counseling:  Dental Health: discussed importance of regular tooth brushing, flossing, and dental visits.  Exercise: the importance of regular exercise/physical activity was discussed. Recommend exercise 3-5  times per week for at least 30 minutes.       Depression Screening and Follow-up Plan: Patient was screened for depression during today's encounter. They screened negative with a PHQ-2 score of 0.        No follow-ups on file.     Chief Complaint:     Chief Complaint   Patient presents with    Physical Exam      History of Present Illness:     Adult Annual Physical   Patient here for a comprehensive physical exam. The patient reports no problems.  Patient has history of dyslipidemia.  Metabolic labs reviewed with patient today.  Noted to have total cholesterol 274, .  This has worsened in the past 6 months.  Patient denies any symptoms of chest pain or shortness of breath.  Patient mostly consumes a healthy diet however also likes his butter and red meat.  Diet and Physical Activity  Diet/Nutrition: well balanced diet and high fat diet.   Exercise: walking.      Depression Screening  PHQ-2/9 Depression Screening    Little interest or pleasure in doing things: 0 - not at all  Feeling down, depressed, or hopeless: 0 - not at all  PHQ-2 Score: 0  PHQ-2 Interpretation: Negative depression screen       General Health  Sleep: sleeps well.   Hearing: normal - bilateral.  Vision: no vision problems.   Dental: regular dental visits.        Health  Symptoms include: none       Review of Systems:     Review of Systems   Constitutional: Negative.    Respiratory: Negative.     Cardiovascular: Negative.       Past Medical History:     History reviewed. No pertinent past medical history.   Past Surgical History:     Past Surgical History:   Procedure Laterality Date    APPENDECTOMY  02/26/2014    emergency surgery    FRACTURE SURGERY Left 07/20/2005    ORIF of open Monteggia fx dislocation left elbow    OTHER SURGICAL HISTORY      removal of hardware left elbow    SHOULDER ARTHROSCOPY Right 9/15/2016    Procedure: DIAGNOSTIC ARTHROSCOPY;  Surgeon: Colt Li MD;  Location: [facility];  Service:     WISDOM TOOTH  "EXTRACTION      x 4      Family History:     Family History   Problem Relation Age of Onset    No Known Problems Mother     Hypertension Father     Prostate cancer Father       Social History:     Social History     Socioeconomic History    Marital status: Single     Spouse name: None    Number of children: None    Years of education: 2 yr college    Highest education level: None   Occupational History    None   Tobacco Use    Smoking status: Never    Smokeless tobacco: Never   Vaping Use    Vaping status: Never Used   Substance and Sexual Activity    Alcohol use: No    Drug use: No    Sexual activity: None   Other Topics Concern    None   Social History Narrative    Most recent tobacco use screenin2019    Do you currently or have you served in the [employer]: No    Were you activated, into active duty, as a member of the [employer] or as a Reservist: No    Education: 2 Year College    Marital status: Single    Exercise level: Heavy    Diet: Specific    General stress level: Medium    Alcohol intake: None    Caffeine intake: Occasional    Chewing tobacco: none    Guns present in home: No    Seat belts used routinely: Yes    Sunscreen used routinely: Yes    Smoke alarm in home: Yes    Advance directive: No    Live alone or with others: alone    Are there stairs in your home: Yes    Pets: No     Social Determinants of Health     Financial Resource Strain: Not on file   Food Insecurity: Not on file   Transportation Needs: Not on file   Physical Activity: Not on file   Stress: Not on file   Social Connections: Not on file   Intimate Partner Violence: Not on file   Housing Stability: Not on file      Current Medications:     No current outpatient medications on file.     No current facility-administered medications for this visit.      Allergies:     No Known Allergies   Physical Exam:     /80   Pulse 80   Ht 5' 10\" (1.778 m)   Wt 90.7 kg (200 lb)   SpO2 97%   BMI 28.70 kg/m²     Physical " Exam  Constitutional:       Appearance: Normal appearance.   HENT:      Right Ear: Tympanic membrane normal.      Left Ear: Tympanic membrane normal.      Mouth/Throat:      Pharynx: No posterior oropharyngeal erythema.   Eyes:      Pupils: Pupils are equal, round, and reactive to light.   Cardiovascular:      Heart sounds: Normal heart sounds.   Pulmonary:      Breath sounds: Normal breath sounds.   Abdominal:      Palpations: Abdomen is soft.   Musculoskeletal:      Right lower leg: No edema.      Left lower leg: No edema.   Lymphadenopathy:      Cervical: No cervical adenopathy.   Neurological:      Mental Status: He is oriented to person, place, and time.   Psychiatric:         Mood and Affect: Mood normal.          Chinyere Rush MD  [facility]

## 2024-03-07 NOTE — PROGRESS NOTES
"Subjective:      Patient ID: Eddie Robledo is a 43 y.o. male.    HPI    History reviewed. No pertinent past medical history.    Family History   Problem Relation Age of Onset    No Known Problems Mother     Hypertension Father     Prostate cancer Father        Past Surgical History:   Procedure Laterality Date    APPENDECTOMY  02/26/2014    emergency surgery    FRACTURE SURGERY Left 07/20/2005    ORIF of open Monteggia fx dislocation left elbow    OTHER SURGICAL HISTORY      removal of hardware left elbow    SHOULDER ARTHROSCOPY Right 9/15/2016    Procedure: DIAGNOSTIC ARTHROSCOPY;  Surgeon: Colt Li MD;  Location: [facility];  Service:     WISDOM TOOTH EXTRACTION      x 4        reports that he has never smoked. He has never used smokeless tobacco. He reports that he does not drink alcohol and does not use drugs.    No current outpatient medications on file.    The following portions of the patient's history were reviewed and updated as appropriate: allergies, current medications, past family history, past medical history, past social history, past surgical history and problem list.    Review of Systems   Constitutional: Negative.    Respiratory: Negative.     Cardiovascular: Negative.            Objective:    /80   Pulse 80   Ht 5' 10\" (1.778 m)   Wt 90.7 kg (200 lb)   SpO2 97%   BMI 28.70 kg/m²      Physical Exam  Constitutional:       Appearance: Normal appearance.   Cardiovascular:      Heart sounds: Normal heart sounds.   Pulmonary:      Breath sounds: Normal breath sounds.           No results found for this or any previous visit (from the past 1008 hour(s)).    Assessment/Plan:    No problem-specific Assessment & Plan notes found for this encounter.         {Assess/PlanSmartLinks:88203}    "

## 2024-03-07 NOTE — ASSESSMENT & PLAN NOTE
Patient's ldl cholesterol 188.     Patient does not have any other cardiac risk factors, no family history of CVD.  Patient is going to continue with his diet and exercise efforts.  Avoid saturated fat, red meat.  Patient declines statins today.  Recommended to continue monitoring lipid panel at 4-month interval

## 2024-03-14 ENCOUNTER — TELEPHONE (OUTPATIENT)
Age: 44
End: 2024-03-14

## 2024-03-14 NOTE — TELEPHONE ENCOUNTER
03/14/24  Screened by: Katia Cuellar    Referring Provider     Pre- Screening:     There is no height or weight on file to calculate BMI.  Has patient been referred for a routine screening Colonoscopy? yes  Is the patient between 45-75 years old? yes      Previous Colonoscopy no   If yes:    Date:     Facility:     Reason:       Does the patient want to see a Gastroenterologist prior to their procedure OR are they having any GI symptoms? no    Has the patient been hospitalized or had abdominal surgery in the past 6 months? no    Does the patient use supplemental oxygen? no    Does the patient take Coumadin, Lovenox, Plavix, Elliquis, Xarelto, or other blood thinning medication? no    Has the patient had a stroke, cardiac event, or stent placed in the past year? no    If patient is between 45yrs - 49yrs, please advise patient that we will have to confirm benefits & coverage with their insurance company for a routine screening colonoscopy.

## 2024-03-14 NOTE — TELEPHONE ENCOUNTER
Scheduled date of colonoscopy (as of today): 8/26/24    Physician performing colonoscopy: SHINE    Location of colonoscopy: [facility]    Bowel prep reviewed with patient:    Pt is requesting Sutab instead of miralax / dulcolax prep    Clearances:  N/A

## 2024-03-15 ENCOUNTER — TELEPHONE (OUTPATIENT)
Dept: GASTROENTEROLOGY | Facility: AMBULARY SURGERY CENTER | Age: 44
End: 2024-03-15

## 2024-03-15 DIAGNOSIS — Z12.11 SCREEN FOR COLON CANCER: Primary | ICD-10-CM

## 2024-03-15 DIAGNOSIS — B36.0 TINEA VERSICOLOR: ICD-10-CM

## 2024-03-15 RX ORDER — SODIUM, POTASSIUM,MAG SULFATES 17.5-3.13G
177 SOLUTION, RECONSTITUTED, ORAL ORAL SEE ADMIN INSTRUCTIONS
Qty: 354 ML | Refills: 0 | Status: SHIPPED | OUTPATIENT
Start: 2024-03-15

## 2024-03-15 NOTE — TELEPHONE ENCOUNTER
Pt returning Annemarie's call, he asked to speak with her specifically but she was with a Pt, transferred to Kristy and she will try to assist

## 2024-04-09 ENCOUNTER — TELEPHONE (OUTPATIENT)
Age: 44
End: 2024-04-09

## 2024-04-09 NOTE — TELEPHONE ENCOUNTER
Reason for call:   Patient requesting a refill for ketoconazole, not on active med list, patient states he does not have an appt with derm until August 2024.    Patient states he gets a nasty rash on his back yearly around this time of year.     [x] Refill   [] Prior Auth  [] Other:     Office:   [x] PCP/Provider - Dr Rush  [] Specialty/Provider -     Medication:   Ketoconazole 200 mg , 1 qd for 7 days       [facility]

## 2024-04-11 DIAGNOSIS — B36.0 TINEA VERSICOLOR: Primary | ICD-10-CM

## 2024-04-11 RX ORDER — KETOCONAZOLE 200 MG/1
200 TABLET ORAL DAILY
Qty: 7 TABLET | Refills: 0 | Status: SHIPPED | OUTPATIENT
Start: 2024-04-11 | End: 2024-04-18

## 2024-04-11 NOTE — TELEPHONE ENCOUNTER
Pt called back and stated that he tried to get in with Derm again but they can't get him in until August. He is willing to schedule an office visit but he said OTC does not work for this and can you please treat him with the med you gave him previously? I did advise you are not in the office

## 2024-05-01 PROBLEM — Z12.11 SCREEN FOR COLON CANCER: Status: RESOLVED | Noted: 2021-07-22 | Resolved: 2024-05-01

## 2024-07-11 ENCOUNTER — CONSULT (OUTPATIENT)
Dept: DERMATOLOGY | Facility: CLINIC | Age: 44
End: 2024-07-11
Payer: COMMERCIAL

## 2024-07-11 DIAGNOSIS — B36.0 TINEA VERSICOLOR: Primary | ICD-10-CM

## 2024-07-11 DIAGNOSIS — Z12.83 SCREENING EXAM FOR SKIN CANCER: ICD-10-CM

## 2024-07-11 PROCEDURE — 99204 OFFICE O/P NEW MOD 45 MIN: CPT | Performed by: DERMATOLOGY

## 2024-07-11 RX ORDER — FLUCONAZOLE 100 MG/1
100 TABLET ORAL DAILY
Qty: 5 TABLET | Refills: 0 | Status: SHIPPED | OUTPATIENT
Start: 2024-07-11 | End: 2024-07-16

## 2024-07-11 NOTE — PATIENT INSTRUCTIONS
TINEA VERSICOLOR    Assessment and Plan:  Based on a thorough discussion of this condition and the management approach to it (including a comprehensive discussion of the known risks, side effects and potential benefits of treatment), the patient (family) agrees to implement the following specific plan:  Start using Diflucan PO for 5 days for flares.   Start using soap with Pyrithione Zinc, or anything used for Dandruff.     What is tinea versicolor?  Tinea versicolor or pityriasis versicolor is a type of fungal infection on the skin due to a yeast that lives on all of us. It Is due an overgrowth of a type of yeast called Malassezia furfur, which feeds on oils in the skin and thrives in warm, humid environments. Anyone can develop tinea versicolor, but it is more common during the summer months and in tropical climates. Those who tend to sweat more heavily are also at higher risk. Although it is not considered infectious, multiple family members can be affected.   Teens and young adults are most susceptible due to having oily skin   Affects people of all skin colors   Weakened immune system predisposes to development     What are the clinical symptoms of tinea versicolor?   The first sign of tinea versicolor is often spots on the skin. They can be lighter or darker than surrounding skin, with colors ranging from white, pink, tan, to brown.   The spots are dry, scaly, and sometimes itchy   Can appear anywhere on the body, but more commonly over the neck, trunk, and arms   Spots can grow together forming larger patches   May disappear when temperature drops and return once it becomes warm again  Pale spots can be confused with vitiligo    How do we diagnose tinea versicolor?  Tinea versicolor is usually diagnosed with a history and physical examination. However, the following tests may be useful for confirmation when in doubt.  Wood lamp (black light) examination-- yellow-green glow may be observed in affected  areas  Microscopy using potassium hydroxide (KOH) to examine skin scrapings  Fungal culture--this is usually reported to be negative, as it is quite difficult to persuade the yeasts to grow in a laboratory  Skin biopsy--fungal elements may be seen within the outer cells of the skin (stratum corneum) on histopathology    How do we treat tinea versicolor?   There are many different options to treat tinea versicolor. The treatment chosen may depend on how thick the spots have grown and how much of the body has been affected. Mild tinea versicolor can be treated with primarily topical antifungal agents. These include:  Ketoconazole cream/shampoo  Selenium sulfide   Terbinafine gel   Ciclopirox cream/solution   Propylene glycol solution   Sodium thiosulphate solution   Topical medications should be applied widely to affected areas before bedtime for between three days to two weeks depending on your dermatologists recommendation.   Use of medicated cleansers once or twice a month may prevent recurrence in those who have has multiple bouts of yeast overgrowth     For extensive skin involvement or after failure of topical medications, oral antifungal agents such as itraconazole and fluconazole can be used. Oral terbinafine used to treat dermatophyte infections is not effective against tinea versicolor.   Vigorous exercise an hour after taking the medication may help sweat it onto the skin surface and enhance clearance of the yeast

## 2024-07-11 NOTE — PROGRESS NOTES
"[facility] Note     Patient Name: Eddie Robledo  Encounter Date: 7/11/24     Have you been cared for by a [facility] Dermatologist in the last 3 years and, if so, which description applies to you?    NO.   I am considered a \"new\" patient and must complete all patient intake questions. I am MALE/not capable of bearing children.    REVIEW OF SYSTEMS:  Have you recently had or currently have any of the following? Recent fever or chills? No  Any non-healing wound? No   PAST MEDICAL HISTORY:  Have you personally ever had or currently have any of the following?  If \"YES,\" then please provide more detail. Skin cancer (such as Melanoma, Basal Cell Carcinoma, Squamous Cell Carcinoma?  No  Tuberculosis, HIV/AIDS, Hepatitis B or C: No  Radiation Treatment No   HISTORY OF IMMUNOSUPPRESSION:   Do you have a history of any of the following:  Systemic Immunosuppression such as Diabetes, Biologic or Immunotherapy, Chemotherapy, Organ Transplantation, Bone Marrow Transplantation?  No     Answering \"YES\" requires the addition of the dotphrase \"IMMUNOSUPPRESSED\" as the first diagnosis of the patient's visit.   FAMILY HISTORY:  Any \"first degree relatives\" (parent, brother, sister, or child) with the following?    Skin Cancer, Pancreatic or Other Cancer? No   PATIENT EXPERIENCE:    Do you want the Dermatologist to perform a COMPLETE skin exam today including a clinical examination under the \"bra and underwear\" areas?  Yes  If necessary, do we have your permission to call and leave a detailed message on your Preferred Phone number that includes your specific medical information?  Yes      No Known Allergies   Current Outpatient Medications:   •  fluconazole (DIFLUCAN) 100 mg tablet, Take 1 tablet (100 mg total) by mouth daily for 5 doses, Disp: 5 tablet, Rfl: 0  •  Na Sulfate-K Sulfate-Mg Sulf 17.5-3.13-1.6 GM/177ML SOLN, Take 177 mL by mouth see administration instructions, Disp: 354 mL, Rfl: 0          Whom besides " the patient is providing clinical information about today's encounter?   NO ADDITIONAL HISTORIAN (patient alone provided history)    Physical Exam and Assessment/Plan by Diagnosis:    SEBORRHEIC KERATOSES  - Relevant exam: Scattered over the trunk/extremities are waxy brown to black plaques and papules with stuck on appearance and consistent dermoscopy  - Exam and clinical history consistent with seborrheic keratoses  - Counseled that these are benign growths that do not require treatment    MELANOCYTIC NEVI  -Relevant exam: Scattered over the trunk/extremities are homogenously pigmented brown macules and papules. ELM performed and without concerning findings. No outliers unless otherwise noted in today's note  - Exam and clinical history consistent with melanocytic nevi  - Counseled to return to clinic prior to scheduled appointment should any of these lesions change or should any new lesions of concern arise  - Counseled on use of sun protection daily. Reviewed latest FDA sunscreen guidelines, including use of broad spectrum (UVA and UVB blocking) sunscreen or sun protective clothing with SPF 30-50 every 2-3 hours and reapplied after exposure to water    LENTIGINES  OTHER SKIN CHANGES DUE TO CHRONIC EXPOSURE TO NONIONIZING RADIATION  - Relevant exam: Over sun exposed areas are brown macules. ELM performed and without concerning findings.  - Exam and clinical history consistent with lentigines.  - Counseled to return to clinic prior to scheduled appointment should any of these lesions change or should any new lesions of concern arise.  - Recommended use of sunscreen as above and below.    CHERRY ANGIOMAS  - Relevant exam: Scattered over the trunk/extremities are red papules  - Exam and clinical history consistent with cherry angiomas  - Educated that these are benign    TINEA VERSICOLOR    Physical Exam:  Anatomic Location Affected:  full body  Morphological Description:  patches of hypopigmentation  Pertinent  "Positives:  Pertinent Negatives:    Additional History of Present Condition:  Patient reports he gets \"white spots\" all over his body every spring since his 20s. He has been on Ketoconazole orally    Assessment and Plan:  Based on a thorough discussion of this condition and the management approach to it (including a comprehensive discussion of the known risks, side effects and potential benefits of treatment), the patient (family) agrees to implement the following specific plan:  Start using Diflucan PO for 5 days for flares.   Start using soap with Pyrithione Zinc, or anything used for Dandruff.     What is tinea versicolor?  Tinea versicolor or pityriasis versicolor is a type of fungal infection on the skin due to a yeast that lives on all of us. It Is due an overgrowth of a type of yeast called Malassezia furfur, which feeds on oils in the skin and thrives in warm, humid environments. Anyone can develop tinea versicolor, but it is more common during the summer months and in tropical climates. Those who tend to sweat more heavily are also at higher risk. Although it is not considered infectious, multiple family members can be affected.   Teens and young adults are most susceptible due to having oily skin   Affects people of all skin colors   Weakened immune system predisposes to development     What are the clinical symptoms of tinea versicolor?   The first sign of tinea versicolor is often spots on the skin. They can be lighter or darker than surrounding skin, with colors ranging from white, pink, tan, to brown.   The spots are dry, scaly, and sometimes itchy   Can appear anywhere on the body, but more commonly over the neck, trunk, and arms   Spots can grow together forming larger patches   May disappear when temperature drops and return once it becomes warm again  Pale spots can be confused with vitiligo    How do we diagnose tinea versicolor?  Tinea versicolor is usually diagnosed with a history and physical " examination. However, the following tests may be useful for confirmation when in doubt.  Wood lamp (black light) examination-- yellow-green glow may be observed in affected areas  Microscopy using potassium hydroxide (KOH) to examine skin scrapings  Fungal culture--this is usually reported to be negative, as it is quite difficult to persuade the yeasts to grow in a laboratory  Skin biopsy--fungal elements may be seen within the outer cells of the skin (stratum corneum) on histopathology    How do we treat tinea versicolor?   There are many different options to treat tinea versicolor. The treatment chosen may depend on how thick the spots have grown and how much of the body has been affected. Mild tinea versicolor can be treated with primarily topical antifungal agents. These include:  Ketoconazole cream/shampoo  Selenium sulfide   Terbinafine gel   Ciclopirox cream/solution   Propylene glycol solution   Sodium thiosulphate solution   Topical medications should be applied widely to affected areas before bedtime for between three days to two weeks depending on your dermatologists recommendation.   Use of medicated cleansers once or twice a month may prevent recurrence in those who have has multiple bouts of yeast overgrowth     For extensive skin involvement or after failure of topical medications, oral antifungal agents such as itraconazole and fluconazole can be used. Oral terbinafine used to treat dermatophyte infections is not effective against tinea versicolor.   Vigorous exercise an hour after taking the medication may help sweat it onto the skin surface and enhance clearance of the yeast    Scribe Attestation    I,:  Chase Pineda am acting as a scribe while in the presence of the attending physician.:       I,:  Marvin Acosta MD personally performed the services described in this documentation    as scribed in my presence.:

## 2024-07-29 ENCOUNTER — APPOINTMENT (OUTPATIENT)
Dept: LAB | Facility: CLINIC | Age: 44
End: 2024-07-29
Payer: COMMERCIAL

## 2024-07-29 ENCOUNTER — TRANSCRIBE ORDERS (OUTPATIENT)
Dept: LAB | Facility: CLINIC | Age: 44
End: 2024-07-29

## 2024-07-29 DIAGNOSIS — E78.49 OTHER HYPERLIPIDEMIA: ICD-10-CM

## 2024-07-29 DIAGNOSIS — R68.82 DECREASED LIBIDO: ICD-10-CM

## 2024-07-29 DIAGNOSIS — Z12.5 SPECIAL SCREENING FOR MALIGNANT NEOPLASM OF PROSTATE: ICD-10-CM

## 2024-07-29 DIAGNOSIS — R53.83 OTHER FATIGUE: ICD-10-CM

## 2024-07-29 DIAGNOSIS — Z12.5 PROSTATE CANCER SCREENING: ICD-10-CM

## 2024-07-29 DIAGNOSIS — Z00.8 HEALTH EXAMINATION IN POPULATION SURVEY: Primary | ICD-10-CM

## 2024-07-29 DIAGNOSIS — Z13.220 SCREENING FOR LIPOID DISORDERS: ICD-10-CM

## 2024-07-29 DIAGNOSIS — Z13.1 SCREENING FOR DIABETES MELLITUS: ICD-10-CM

## 2024-07-29 DIAGNOSIS — Z13.29 SCREENING FOR THYROID DISORDER: ICD-10-CM

## 2024-07-30 ENCOUNTER — APPOINTMENT (OUTPATIENT)
Dept: LAB | Facility: CLINIC | Age: 44
End: 2024-07-30
Payer: COMMERCIAL

## 2024-07-30 DIAGNOSIS — Z00.8 HEALTH EXAMINATION IN POPULATION SURVEY: ICD-10-CM

## 2024-07-30 DIAGNOSIS — Z13.220 SCREENING FOR LIPOID DISORDERS: ICD-10-CM

## 2024-07-30 DIAGNOSIS — R53.83 OTHER FATIGUE: ICD-10-CM

## 2024-07-30 DIAGNOSIS — Z13.29 SCREENING FOR THYROID DISORDER: ICD-10-CM

## 2024-07-30 DIAGNOSIS — Z13.1 SCREENING FOR DIABETES MELLITUS: ICD-10-CM

## 2024-07-30 DIAGNOSIS — Z12.5 SPECIAL SCREENING FOR MALIGNANT NEOPLASM OF PROSTATE: ICD-10-CM

## 2024-07-30 DIAGNOSIS — R68.82 DECREASED LIBIDO: ICD-10-CM

## 2024-07-30 LAB
ALBUMIN SERPL BCG-MCNC: 4.1 G/DL (ref 3.5–5)
ALP SERPL-CCNC: 55 U/L (ref 34–104)
ALT SERPL W P-5'-P-CCNC: 11 U/L (ref 7–52)
ANION GAP SERPL CALCULATED.3IONS-SCNC: 9 MMOL/L (ref 4–13)
AST SERPL W P-5'-P-CCNC: 13 U/L (ref 13–39)
BILIRUB SERPL-MCNC: 0.5 MG/DL (ref 0.2–1)
BUN SERPL-MCNC: 17 MG/DL (ref 5–25)
CALCIUM SERPL-MCNC: 9.2 MG/DL (ref 8.4–10.2)
CHLORIDE SERPL-SCNC: 108 MMOL/L (ref 96–108)
CHOLEST SERPL-MCNC: 251 MG/DL
CO2 SERPL-SCNC: 25 MMOL/L (ref 21–32)
CREAT SERPL-MCNC: 1.08 MG/DL (ref 0.6–1.3)
CRP SERPL HS-MCNC: 1.03 MG/L
GFR SERPL CREATININE-BSD FRML MDRD: 82 ML/MIN/1.73SQ M
GGT SERPL-CCNC: 14 U/L (ref 9–64)
GLUCOSE P FAST SERPL-MCNC: 91 MG/DL (ref 65–99)
HDLC SERPL-MCNC: 70 MG/DL
LDH SERPL-CCNC: 108 U/L (ref 140–271)
LDLC SERPL CALC-MCNC: 165 MG/DL (ref 0–100)
MAGNESIUM SERPL-MCNC: 2 MG/DL (ref 1.9–2.7)
NONHDLC SERPL-MCNC: 181 MG/DL
POTASSIUM SERPL-SCNC: 4.3 MMOL/L (ref 3.5–5.3)
PROT SERPL-MCNC: 6.3 G/DL (ref 6.4–8.4)
PSA FREE MFR SERPL: 31.71 %
PSA FREE SERPL-MCNC: 0.52 NG/ML
PSA SERPL-MCNC: 1.65 NG/ML (ref 0–4)
SODIUM SERPL-SCNC: 142 MMOL/L (ref 135–147)
TRIGL SERPL-MCNC: 81 MG/DL
URATE SERPL-MCNC: 4.6 MG/DL (ref 3.5–8.5)

## 2024-07-30 PROCEDURE — 83735 ASSAY OF MAGNESIUM: CPT

## 2024-07-30 PROCEDURE — 84153 ASSAY OF PSA TOTAL: CPT

## 2024-07-30 PROCEDURE — 80053 COMPREHEN METABOLIC PANEL: CPT

## 2024-07-30 PROCEDURE — 82679 ASSAY OF ESTRONE: CPT

## 2024-07-30 PROCEDURE — 84154 ASSAY OF PSA FREE: CPT

## 2024-07-30 PROCEDURE — 83003 ASSAY GROWTH HORMONE (HGH): CPT

## 2024-07-30 PROCEDURE — 82977 ASSAY OF GGT: CPT

## 2024-07-30 PROCEDURE — 84402 ASSAY OF FREE TESTOSTERONE: CPT

## 2024-07-30 PROCEDURE — 80061 LIPID PANEL: CPT

## 2024-07-30 PROCEDURE — 82677 ASSAY OF ESTRIOL: CPT

## 2024-07-30 PROCEDURE — 82642 DIHYDROTESTOSTERONE: CPT

## 2024-07-30 PROCEDURE — 86141 C-REACTIVE PROTEIN HS: CPT

## 2024-07-30 PROCEDURE — 84550 ASSAY OF BLOOD/URIC ACID: CPT

## 2024-07-30 PROCEDURE — 82626 DEHYDROEPIANDROSTERONE: CPT

## 2024-07-30 PROCEDURE — 84482 T3 REVERSE: CPT

## 2024-07-30 PROCEDURE — 36415 COLL VENOUS BLD VENIPUNCTURE: CPT

## 2024-07-30 PROCEDURE — 84403 ASSAY OF TOTAL TESTOSTERONE: CPT

## 2024-07-30 PROCEDURE — 83615 LACTATE (LD) (LDH) ENZYME: CPT

## 2024-07-30 PROCEDURE — 86376 MICROSOMAL ANTIBODY EACH: CPT

## 2024-07-31 ENCOUNTER — APPOINTMENT (OUTPATIENT)
Dept: LAB | Facility: CLINIC | Age: 44
End: 2024-07-31
Payer: COMMERCIAL

## 2024-07-31 LAB
ALBUMIN SERPL BCG-MCNC: 4.4 G/DL (ref 3.5–5)
ALP SERPL-CCNC: 58 U/L (ref 34–104)
ALT SERPL W P-5'-P-CCNC: 10 U/L (ref 7–52)
ANION GAP SERPL CALCULATED.3IONS-SCNC: 7 MMOL/L (ref 4–13)
AST SERPL W P-5'-P-CCNC: 14 U/L (ref 13–39)
BASOPHILS # BLD AUTO: 0.03 THOUSANDS/ÂΜL (ref 0–0.1)
BASOPHILS NFR BLD AUTO: 1 % (ref 0–1)
BILIRUB SERPL-MCNC: 0.68 MG/DL (ref 0.2–1)
BUN SERPL-MCNC: 14 MG/DL (ref 5–25)
CALCIUM SERPL-MCNC: 9.3 MG/DL (ref 8.4–10.2)
CHLORIDE SERPL-SCNC: 107 MMOL/L (ref 96–108)
CHOLEST SERPL-MCNC: 259 MG/DL
CO2 SERPL-SCNC: 27 MMOL/L (ref 21–32)
CORTIS SERPL-MCNC: 11.2 UG/DL
CREAT SERPL-MCNC: 1.01 MG/DL (ref 0.6–1.3)
EOSINOPHIL # BLD AUTO: 0.41 THOUSAND/ÂΜL (ref 0–0.61)
EOSINOPHIL NFR BLD AUTO: 9 % (ref 0–6)
ERYTHROCYTE [DISTWIDTH] IN BLOOD BY AUTOMATED COUNT: 12.3 % (ref 11.6–15.1)
ERYTHROCYTE [SEDIMENTATION RATE] IN BLOOD: 8 MM/HOUR (ref 0–14)
EST. AVERAGE GLUCOSE BLD GHB EST-MCNC: 114 MG/DL
ESTRADIOL SERPL-MCNC: 27.4 PG/ML
FERRITIN SERPL-MCNC: 117 NG/ML (ref 24–336)
FSH SERPL-ACNC: 4.8 MIU/ML
GFR SERPL CREATININE-BSD FRML MDRD: 89 ML/MIN/1.73SQ M
GLUCOSE P FAST SERPL-MCNC: 88 MG/DL (ref 65–99)
HBA1C MFR BLD: 5.6 %
HCT VFR BLD AUTO: 45.2 % (ref 36.5–49.3)
HCYS SERPL-SCNC: 9.2 UMOL/L (ref 5–15)
HDLC SERPL-MCNC: 67 MG/DL
HGB BLD-MCNC: 14.9 G/DL (ref 12–17)
IMM GRANULOCYTES # BLD AUTO: 0 THOUSAND/UL (ref 0–0.2)
IMM GRANULOCYTES NFR BLD AUTO: 0 % (ref 0–2)
LDLC SERPL CALC-MCNC: 174 MG/DL (ref 0–100)
LH SERPL-ACNC: 2.9 MIU/ML
LYMPHOCYTES # BLD AUTO: 1.95 THOUSANDS/ÂΜL (ref 0.6–4.47)
LYMPHOCYTES NFR BLD AUTO: 41 % (ref 14–44)
MCH RBC QN AUTO: 28.6 PG (ref 26.8–34.3)
MCHC RBC AUTO-ENTMCNC: 33 G/DL (ref 31.4–37.4)
MCV RBC AUTO: 87 FL (ref 82–98)
MONOCYTES # BLD AUTO: 0.43 THOUSAND/ÂΜL (ref 0.17–1.22)
MONOCYTES NFR BLD AUTO: 9 % (ref 4–12)
NEUTROPHILS # BLD AUTO: 1.98 THOUSANDS/ÂΜL (ref 1.85–7.62)
NEUTS SEG NFR BLD AUTO: 40 % (ref 43–75)
NONHDLC SERPL-MCNC: 192 MG/DL
NRBC BLD AUTO-RTO: 0 /100 WBCS
PLATELET # BLD AUTO: 326 THOUSANDS/UL (ref 149–390)
PMV BLD AUTO: 9.9 FL (ref 8.9–12.7)
POTASSIUM SERPL-SCNC: 4 MMOL/L (ref 3.5–5.3)
PROGEST SERPL-MCNC: 0.44 NG/ML
PROLACTIN SERPL-MCNC: 9.84 NG/ML
PROT SERPL-MCNC: 7 G/DL (ref 6.4–8.4)
PSA SERPL-MCNC: 1.83 NG/ML (ref 0–4)
RBC # BLD AUTO: 5.21 MILLION/UL (ref 3.88–5.62)
SODIUM SERPL-SCNC: 141 MMOL/L (ref 135–147)
T3 SERPL-MCNC: 0.9 NG/ML
T3FREE SERPL-MCNC: 3.52 PG/ML (ref 2.5–3.9)
T4 FREE SERPL-MCNC: 0.9 NG/DL (ref 0.61–1.12)
T4 SERPL-MCNC: 10.51 UG/DL (ref 6.09–12.23)
TESTOST FREE SERPL-MCNC: 11.8 PG/ML (ref 6.8–21.5)
TESTOST SERPL-MCNC: 912 NG/DL (ref 264–916)
THYROPEROXIDASE AB SERPL-ACNC: 12 IU/ML (ref 0–34)
TRIGL SERPL-MCNC: 89 MG/DL
TSH SERPL DL<=0.05 MIU/L-ACNC: 1.88 UIU/ML (ref 0.45–4.5)
WBC # BLD AUTO: 4.8 THOUSAND/UL (ref 4.31–10.16)

## 2024-07-31 PROCEDURE — 80061 LIPID PANEL: CPT

## 2024-07-31 PROCEDURE — 83090 ASSAY OF HOMOCYSTEINE: CPT

## 2024-07-31 PROCEDURE — 84439 ASSAY OF FREE THYROXINE: CPT

## 2024-07-31 PROCEDURE — 82533 TOTAL CORTISOL: CPT

## 2024-07-31 PROCEDURE — 84443 ASSAY THYROID STIM HORMONE: CPT

## 2024-07-31 PROCEDURE — 82670 ASSAY OF TOTAL ESTRADIOL: CPT

## 2024-07-31 PROCEDURE — 84481 FREE ASSAY (FT-3): CPT

## 2024-07-31 PROCEDURE — 85025 COMPLETE CBC W/AUTO DIFF WBC: CPT

## 2024-07-31 PROCEDURE — 36415 COLL VENOUS BLD VENIPUNCTURE: CPT

## 2024-07-31 PROCEDURE — G0103 PSA SCREENING: HCPCS

## 2024-07-31 PROCEDURE — 80053 COMPREHEN METABOLIC PANEL: CPT

## 2024-07-31 PROCEDURE — 82728 ASSAY OF FERRITIN: CPT

## 2024-07-31 PROCEDURE — 85652 RBC SED RATE AUTOMATED: CPT

## 2024-07-31 PROCEDURE — 83002 ASSAY OF GONADOTROPIN (LH): CPT

## 2024-07-31 PROCEDURE — 84480 ASSAY TRIIODOTHYRONINE (T3): CPT

## 2024-07-31 PROCEDURE — 84144 ASSAY OF PROGESTERONE: CPT

## 2024-07-31 PROCEDURE — 83036 HEMOGLOBIN GLYCOSYLATED A1C: CPT

## 2024-07-31 PROCEDURE — 84146 ASSAY OF PROLACTIN: CPT

## 2024-07-31 PROCEDURE — 83001 ASSAY OF GONADOTROPIN (FSH): CPT

## 2024-08-01 LAB
ESTRONE SERPL-MCNC: 30 PG/ML (ref 0–174)
GH SERPL-MCNC: 0.1 NG/ML (ref 0–10)

## 2024-08-02 LAB — ESTRIOL SERPL-MCNC: <0.1 NG/ML

## 2024-08-05 LAB
DHEA SERPL-MCNC: 371 NG/DL (ref 31–701)
T3REVERSE SERPL-MCNC: 22.7 NG/DL (ref 9.2–24.1)

## 2024-08-06 LAB — ANDROSTANOLONE SERPL-MCNC: 62 NG/DL

## 2024-08-15 ENCOUNTER — OFFICE VISIT (OUTPATIENT)
Dept: FAMILY MEDICINE CLINIC | Facility: CLINIC | Age: 44
End: 2024-08-15
Payer: COMMERCIAL

## 2024-08-15 VITALS
SYSTOLIC BLOOD PRESSURE: 122 MMHG | BODY MASS INDEX: 27.92 KG/M2 | HEART RATE: 63 BPM | DIASTOLIC BLOOD PRESSURE: 76 MMHG | HEIGHT: 70 IN | WEIGHT: 195 LBS | OXYGEN SATURATION: 99 %

## 2024-08-15 DIAGNOSIS — E78.49 OTHER HYPERLIPIDEMIA: Primary | ICD-10-CM

## 2024-08-15 PROCEDURE — 99213 OFFICE O/P EST LOW 20 MIN: CPT | Performed by: FAMILY MEDICINE

## 2024-08-15 NOTE — ASSESSMENT & PLAN NOTE
Patient's ldl cholesterol 165    Patient does not have any other cardiac risk factors, no family history of CVD.  Patient is going to continue with his diet and exercise efforts.  Avoid saturated fat, red meat.  Patient declines coronary calcium score/statins today.  Recommended to continue monitoring lipid panel at 6-month interval

## 2024-08-15 NOTE — PROGRESS NOTES
"Subjective:      Patient ID: Eddie Robledo is a 43 y.o. male.    HPI    Patient is following up on his chronic medical problems.  Has history of dyslipidemia.  Metabolic labs revealed slight improvement in his LDL with changes in his diet.      History reviewed. No pertinent past medical history.    Family History   Problem Relation Age of Onset    No Known Problems Mother     Hypertension Father     Prostate cancer Father        Past Surgical History:   Procedure Laterality Date    APPENDECTOMY  02/26/2014    emergency surgery    FRACTURE SURGERY Left 07/20/2005    ORIF of open Monteggia fx dislocation left elbow    OTHER SURGICAL HISTORY      removal of hardware left elbow    SHOULDER ARTHROSCOPY Right 9/15/2016    Procedure: DIAGNOSTIC ARTHROSCOPY;  Surgeon: Colt Li MD;  Location: [facility];  Service:     WISDOM TOOTH EXTRACTION      x 4        reports that he has never smoked. He has never used smokeless tobacco. He reports that he does not drink alcohol and does not use drugs.      Current Outpatient Medications:     Na Sulfate-K Sulfate-Mg Sulf 17.5-3.13-1.6 GM/177ML SOLN, Take 177 mL by mouth see administration instructions (Patient not taking: Reported on 8/15/2024), Disp: 354 mL, Rfl: 0    The following portions of the patient's history were reviewed and updated as appropriate: allergies, current medications, past family history, past medical history, past social history, past surgical history and problem list.    Review of Systems   Constitutional: Negative.    Respiratory: Negative.     Cardiovascular: Negative.            Objective:    /76   Pulse 63   Ht 5' 10\" (1.778 m)   Wt 88.5 kg (195 lb)   SpO2 99%   BMI 27.98 kg/m²      Physical Exam  Constitutional:       Appearance: Normal appearance.   Cardiovascular:      Heart sounds: Normal heart sounds.   Pulmonary:      Breath sounds: Normal breath sounds.           Recent Results (from the past 1008 hour(s))   Lipid panel    " Collection Time: 07/30/24  7:49 AM   Result Value Ref Range    Cholesterol 251 (H) See Comment mg/dL    Triglycerides 81 See Comment mg/dL    HDL, Direct 70 >=40 mg/dL    LDL Calculated 165 (H) 0 - 100 mg/dL    Non-HDL-Chol (CHOL-HDL) 181 mg/dl   Comprehensive metabolic panel    Collection Time: 07/30/24  7:49 AM   Result Value Ref Range    Sodium 142 135 - 147 mmol/L    Potassium 4.3 3.5 - 5.3 mmol/L    Chloride 108 96 - 108 mmol/L    CO2 25 21 - 32 mmol/L    ANION GAP 9 4 - 13 mmol/L    BUN 17 5 - 25 mg/dL    Creatinine 1.08 0.60 - 1.30 mg/dL    Glucose, Fasting 91 65 - 99 mg/dL    Calcium 9.2 8.4 - 10.2 mg/dL    AST 13 13 - 39 U/L    ALT 11 7 - 52 U/L    Alkaline Phosphatase 55 34 - 104 U/L    Total Protein 6.3 (L) 6.4 - 8.4 g/dL    Albumin 4.1 3.5 - 5.0 g/dL    Total Bilirubin 0.50 0.20 - 1.00 mg/dL    eGFR 82 ml/min/1.73sq m   Gamma GT    Collection Time: 07/30/24  7:49 AM   Result Value Ref Range    GGT 14 9 - 64 U/L   DHEA    Collection Time: 07/30/24  7:49 AM   Result Value Ref Range    DHEA 371 31 - 701 ng/dL   PSA, total and free    Collection Time: 07/30/24  7:49 AM   Result Value Ref Range    PSA, Diagnostic 1.646 0.000 - 4.000 ng/mL    PSA, Free 0.522 ng/mL    PSA % Free 31.713 %   LD,Blood    Collection Time: 07/30/24  7:49 AM   Result Value Ref Range     (L) 140 - 271 U/L   Testosterone, free, total    Collection Time: 07/30/24  7:49 AM   Result Value Ref Range    Testosterone, Free 11.8 6.8 - 21.5 pg/mL    TESTOSTERONE TOTAL 912 264 - 916 ng/dL   Dihydrotestosterone    Collection Time: 07/30/24  7:49 AM   Result Value Ref Range    Dihydrotestosterone 62 ng/dL   Anti-microsomal antibody    Collection Time: 07/30/24  7:49 AM   Result Value Ref Range    THYROID MICROSOMAL ANTIBODY 12 0 - 34 IU/mL   Magnesium    Collection Time: 07/30/24  7:49 AM   Result Value Ref Range    Magnesium 2.0 1.9 - 2.7 mg/dL   Estriol    Collection Time: 07/30/24  7:49 AM   Result Value Ref Range    Estriol <0.1 Not  Estab. ng/mL   T3, reverse    Collection Time: 07/30/24  7:49 AM   Result Value Ref Range    T3, Reverse 22.7 9.2 - 24.1 ng/dL   Estrone    Collection Time: 07/30/24  7:49 AM   Result Value Ref Range    Estrone 30 0 - 174 pg/mL   Uric acid    Collection Time: 07/30/24  7:49 AM   Result Value Ref Range    Uric Acid 4.6 3.5 - 8.5 mg/dL   High sensitivity CRP    Collection Time: 07/30/24  7:49 AM   Result Value Ref Range    CRP, High Sensitivity 1.03 <3.00 mg/L   Growth hormone    Collection Time: 07/30/24  7:49 AM   Result Value Ref Range    Growth Hormone 0.1 0.0 - 10.0 ng/mL   Comprehensive metabolic panel    Collection Time: 07/31/24  8:49 AM   Result Value Ref Range    Sodium 141 135 - 147 mmol/L    Potassium 4.0 3.5 - 5.3 mmol/L    Chloride 107 96 - 108 mmol/L    CO2 27 21 - 32 mmol/L    ANION GAP 7 4 - 13 mmol/L    BUN 14 5 - 25 mg/dL    Creatinine 1.01 0.60 - 1.30 mg/dL    Glucose, Fasting 88 65 - 99 mg/dL    Calcium 9.3 8.4 - 10.2 mg/dL    AST 14 13 - 39 U/L    ALT 10 7 - 52 U/L    Alkaline Phosphatase 58 34 - 104 U/L    Total Protein 7.0 6.4 - 8.4 g/dL    Albumin 4.4 3.5 - 5.0 g/dL    Total Bilirubin 0.68 0.20 - 1.00 mg/dL    eGFR 89 ml/min/1.73sq m   PSA, Total Screen    Collection Time: 07/31/24  8:49 AM   Result Value Ref Range    PSA 1.828 0.000 - 4.000 ng/mL   Follicle stimulating hormone    Collection Time: 07/31/24  8:49 AM   Result Value Ref Range    FSH 4.8 1.3 - 19.3 mIU/mL   Luteinizing hormone    Collection Time: 07/31/24  8:49 AM   Result Value Ref Range    LH 2.9 1.2 - 8.6 mIU/mL   T3    Collection Time: 07/31/24  8:49 AM   Result Value Ref Range    T3, Total 0.9 0.9-1.8 ng/mL ng/mL   T3, free    Collection Time: 07/31/24  8:49 AM   Result Value Ref Range    T3, Free 3.52 2.50 - 3.90 pg/mL   T4, free    Collection Time: 07/31/24  8:49 AM   Result Value Ref Range    Free T4 0.90 0.61 - 1.12 ng/dL   Sedimentation rate, automated    Collection Time: 07/31/24  8:49 AM   Result Value Ref Range    Sed  Rate 8 0 - 14 mm/hour   CBC and differential    Collection Time: 07/31/24  8:49 AM   Result Value Ref Range    WBC 4.80 4.31 - 10.16 Thousand/uL    RBC 5.21 3.88 - 5.62 Million/uL    Hemoglobin 14.9 12.0 - 17.0 g/dL    Hematocrit 45.2 36.5 - 49.3 %    MCV 87 82 - 98 fL    MCH 28.6 26.8 - 34.3 pg    MCHC 33.0 31.4 - 37.4 g/dL    RDW 12.3 11.6 - 15.1 %    MPV 9.9 8.9 - 12.7 fL    Platelets 326 149 - 390 Thousands/uL    nRBC 0 /100 WBCs    Segmented % 40 (L) 43 - 75 %    Immature Grans % 0 0 - 2 %    Lymphocytes % 41 14 - 44 %    Monocytes % 9 4 - 12 %    Eosinophils Relative 9 (H) 0 - 6 %    Basophils Relative 1 0 - 1 %    Absolute Neutrophils 1.98 1.85 - 7.62 Thousands/µL    Absolute Immature Grans 0.00 0.00 - 0.20 Thousand/uL    Absolute Lymphocytes 1.95 0.60 - 4.47 Thousands/µL    Absolute Monocytes 0.43 0.17 - 1.22 Thousand/µL    Eosinophils Absolute 0.41 0.00 - 0.61 Thousand/µL    Basophils Absolute 0.03 0.00 - 0.10 Thousands/µL   Prolactin    Collection Time: 07/31/24  8:49 AM   Result Value Ref Range    Prolactin 9.84 2.64 - 13.13 ng/mL   Cortisol    Collection Time: 07/31/24  8:49 AM   Result Value Ref Range    Cortisol, Random 11.2 ug/dL   Ferritin    Collection Time: 07/31/24  8:49 AM   Result Value Ref Range    Ferritin 117 24 - 336 ng/mL   T4    Collection Time: 07/31/24  8:49 AM   Result Value Ref Range    T4 TOTAL 10.51 6.09 - 12.23 ug/dL   Lipid panel    Collection Time: 07/31/24  8:49 AM   Result Value Ref Range    Cholesterol 259 (H) See Comment mg/dL    Triglycerides 89 See Comment mg/dL    HDL, Direct 67 >=40 mg/dL    LDL Calculated 174 (H) 0 - 100 mg/dL    Non-HDL-Chol (CHOL-HDL) 192 mg/dl   Estradiol    Collection Time: 07/31/24  8:49 AM   Result Value Ref Range    Estradiol 27.4 0.0 - 33.1 pg/mL   TSH, 3rd generation    Collection Time: 07/31/24  8:49 AM   Result Value Ref Range    TSH 3RD GENERATON 1.882 0.450 - 4.500 uIU/mL   Progesterone    Collection Time: 07/31/24  8:49 AM   Result Value Ref  Range    Progesterone 0.44 0.14 - 2.06 ng/mL   Hemoglobin A1C    Collection Time: 07/31/24  8:49 AM   Result Value Ref Range    Hemoglobin A1C 5.6 Normal 4.0-5.6%; PreDiabetic 5.7-6.4%; Diabetic >=6.5%; Glycemic control for adults with diabetes <7.0% %     mg/dl   Homocysteine    Collection Time: 07/31/24  8:49 AM   Result Value Ref Range    Homocyst(e)ine, P/S 9.2 5.0 - 15.0 umol/L       Assessment/Plan:    No problem-specific Assessment & Plan notes found for this encounter.           Problem List Items Addressed This Visit          Other    Other hyperlipidemia - Primary     Patient's ldl cholesterol 165    Patient does not have any other cardiac risk factors, no family history of CVD.  Patient is going to continue with his diet and exercise efforts.  Avoid saturated fat, red meat.  Patient declines coronary calcium score/statins today.  Recommended to continue monitoring lipid panel at 6-month interval         Relevant Orders    Comprehensive metabolic panel    Lipid panel